# Patient Record
Sex: FEMALE | Race: WHITE | NOT HISPANIC OR LATINO | Employment: FULL TIME | URBAN - NONMETROPOLITAN AREA
[De-identification: names, ages, dates, MRNs, and addresses within clinical notes are randomized per-mention and may not be internally consistent; named-entity substitution may affect disease eponyms.]

---

## 2020-09-07 ENCOUNTER — HOSPITAL ENCOUNTER (EMERGENCY)
Facility: HOSPITAL | Age: 20
Discharge: HOME | End: 2020-09-08
Attending: EMERGENCY MEDICINE | Admitting: EMERGENCY MEDICINE

## 2020-09-07 DIAGNOSIS — R56.9 SEIZURE-LIKE ACTIVITY (HCC): Primary | ICD-10-CM

## 2020-09-07 PROCEDURE — 25010000002 LORAZEPAM PER 2 MG: Performed by: EMERGENCY MEDICINE

## 2020-09-07 PROCEDURE — 99284 EMERGENCY DEPT VISIT MOD MDM: CPT

## 2020-09-07 PROCEDURE — 93005 ELECTROCARDIOGRAM TRACING: CPT | Performed by: EMERGENCY MEDICINE

## 2020-09-07 PROCEDURE — 96374 THER/PROPH/DIAG INJ IV PUSH: CPT

## 2020-09-07 RX ORDER — LORAZEPAM 2 MG/ML
1 INJECTION INTRAMUSCULAR ONCE
Status: COMPLETED | OUTPATIENT
Start: 2020-09-07 | End: 2020-09-07

## 2020-09-07 RX ADMIN — SODIUM CHLORIDE 1000 ML: 9 INJECTION, SOLUTION INTRAVENOUS at 23:58

## 2020-09-07 RX ADMIN — LORAZEPAM 1 MG: 2 INJECTION, SOLUTION INTRAMUSCULAR; INTRAVENOUS at 23:59

## 2020-09-08 VITALS
TEMPERATURE: 98.3 F | DIASTOLIC BLOOD PRESSURE: 70 MMHG | BODY MASS INDEX: 26.58 KG/M2 | RESPIRATION RATE: 16 BRPM | SYSTOLIC BLOOD PRESSURE: 109 MMHG | OXYGEN SATURATION: 99 % | HEIGHT: 63 IN | HEART RATE: 80 BPM | WEIGHT: 150 LBS

## 2020-09-08 LAB
ALBUMIN SERPL-MCNC: 3.9 G/DL (ref 3.5–5.2)
ALBUMIN/GLOB SERPL: 1.4 G/DL
ALP SERPL-CCNC: 90 U/L (ref 39–117)
ALT SERPL W P-5'-P-CCNC: 9 U/L (ref 1–33)
ANION GAP SERPL CALCULATED.3IONS-SCNC: 11.5 MMOL/L (ref 5–15)
AST SERPL-CCNC: 16 U/L (ref 1–32)
BASOPHILS # BLD AUTO: 0.06 10*3/MM3 (ref 0–0.2)
BASOPHILS NFR BLD AUTO: 0.5 % (ref 0–1.5)
BILIRUB SERPL-MCNC: 0.2 MG/DL (ref 0–1.2)
BUN SERPL-MCNC: 7 MG/DL (ref 6–20)
BUN/CREAT SERPL: 10.1 (ref 7–25)
CALCIUM SPEC-SCNC: 9.2 MG/DL (ref 8.6–10.5)
CHLORIDE SERPL-SCNC: 104 MMOL/L (ref 98–107)
CO2 SERPL-SCNC: 23.5 MMOL/L (ref 22–29)
CREAT SERPL-MCNC: 0.69 MG/DL (ref 0.57–1)
DEPRECATED RDW RBC AUTO: 37.5 FL (ref 37–54)
EOSINOPHIL # BLD AUTO: 0.55 10*3/MM3 (ref 0–0.4)
EOSINOPHIL NFR BLD AUTO: 4.6 % (ref 0.3–6.2)
ERYTHROCYTE [DISTWIDTH] IN BLOOD BY AUTOMATED COUNT: 12.2 % (ref 12.3–15.4)
GFR SERPL CREATININE-BSD FRML MDRD: 107 ML/MIN/1.73
GLOBULIN UR ELPH-MCNC: 2.8 GM/DL
GLUCOSE SERPL-MCNC: 88 MG/DL (ref 65–99)
HCG SERPL QL: NEGATIVE
HCT VFR BLD AUTO: 40.8 % (ref 34–46.6)
HGB BLD-MCNC: 14.1 G/DL (ref 12–15.9)
IMM GRANULOCYTES # BLD AUTO: 0.05 10*3/MM3 (ref 0–0.05)
IMM GRANULOCYTES NFR BLD AUTO: 0.4 % (ref 0–0.5)
LYMPHOCYTES # BLD AUTO: 3.56 10*3/MM3 (ref 0.7–3.1)
LYMPHOCYTES NFR BLD AUTO: 30 % (ref 19.6–45.3)
MCH RBC QN AUTO: 29.4 PG (ref 26.6–33)
MCHC RBC AUTO-ENTMCNC: 34.6 G/DL (ref 31.5–35.7)
MCV RBC AUTO: 85 FL (ref 79–97)
MONOCYTES # BLD AUTO: 1.05 10*3/MM3 (ref 0.1–0.9)
MONOCYTES NFR BLD AUTO: 8.8 % (ref 5–12)
NEUTROPHILS NFR BLD AUTO: 55.7 % (ref 42.7–76)
NEUTROPHILS NFR BLD AUTO: 6.61 10*3/MM3 (ref 1.7–7)
NRBC BLD AUTO-RTO: 0 /100 WBC (ref 0–0.2)
PLATELET # BLD AUTO: 269 10*3/MM3 (ref 140–450)
PMV BLD AUTO: 9.7 FL (ref 6–12)
POTASSIUM SERPL-SCNC: 3.7 MMOL/L (ref 3.5–5.2)
PROT SERPL-MCNC: 6.7 G/DL (ref 6–8.5)
RBC # BLD AUTO: 4.8 10*6/MM3 (ref 3.77–5.28)
SODIUM SERPL-SCNC: 139 MMOL/L (ref 136–145)
WBC # BLD AUTO: 11.88 10*3/MM3 (ref 3.4–10.8)

## 2020-09-08 PROCEDURE — 80053 COMPREHEN METABOLIC PANEL: CPT | Performed by: EMERGENCY MEDICINE

## 2020-09-08 PROCEDURE — 85025 COMPLETE CBC W/AUTO DIFF WBC: CPT | Performed by: EMERGENCY MEDICINE

## 2020-09-08 PROCEDURE — 84703 CHORIONIC GONADOTROPIN ASSAY: CPT | Performed by: EMERGENCY MEDICINE

## 2020-09-08 NOTE — DISCHARGE INSTRUCTIONS
Follow-up with your primary care doctor and neurologist, if you do not have a primary care doctor or neurologist follow-up with the providers listed above.

## 2020-09-08 NOTE — ED PROVIDER NOTES
"Subjective   20-year-old female presenting with \"nonepileptic seizure\".  She is brought in by her friend.  Patient is apparently only able to communicate with sign language after having an episode so history obtained from friend and girlfriend.  Apparently patient had onset of facial tics, this progressed to full body convulsions.  This lasted about 45 minutes.  She has no specific complaints at this time.  Patient does note stressors with school.  No other complaints or concerns.          Review of Systems   Constitutional: Negative.    HENT: Negative.    Eyes: Negative.    Respiratory: Negative.    Cardiovascular: Negative.    Gastrointestinal: Negative.    Genitourinary: Negative.    Musculoskeletal: Negative.    Skin: Negative.    Neurological: Positive for tremors and seizures. Negative for weakness, numbness and headaches.   Psychiatric/Behavioral: Negative.        Past Medical History:   Diagnosis Date   • Seizures (CMS/HCC)        No Known Allergies    Past Surgical History:   Procedure Laterality Date   • BREAST SURGERY         History reviewed. No pertinent family history.    Social History     Socioeconomic History   • Marital status: Single     Spouse name: Not on file   • Number of children: Not on file   • Years of education: Not on file   • Highest education level: Not on file   Tobacco Use   • Smoking status: Never Smoker   Substance and Sexual Activity   • Alcohol use: Never     Frequency: Never   • Drug use: Never           Objective   Physical Exam   Constitutional: She is oriented to person, place, and time. She appears well-developed and well-nourished. No distress.   HENT:   Head: Normocephalic and atraumatic.   Right Ear: External ear normal.   Left Ear: External ear normal.   Nose: Nose normal.   Mouth/Throat: Oropharynx is clear and moist.   Eyes: Pupils are equal, round, and reactive to light. Conjunctivae and EOM are normal.   Neck: Normal range of motion. Neck supple.   Cardiovascular: " "Normal rate, regular rhythm, normal heart sounds and intact distal pulses.   Pulmonary/Chest: Effort normal and breath sounds normal. No respiratory distress.   Abdominal: Soft. Bowel sounds are normal. She exhibits no distension. There is no tenderness. There is no rebound and no guarding.   Musculoskeletal: Normal range of motion. She exhibits no edema, tenderness or deformity.   Neurological: She is alert and oriented to person, place, and time.   Patient is nonverbal but communicates via sign language with her friend, shakes her head yes and no to questions, moves all extremities, cranial nerves intact   Skin: Skin is warm and dry. No rash noted.   Psychiatric: She has a normal mood and affect. Her behavior is normal.   Nursing note and vitals reviewed.      Procedures           ED Course                                           MDM  Number of Diagnoses or Management Options  Seizure-like activity (CMS/[facility]):   Diagnosis management comments: 20-year-old female with convulsions.  Well-developed, well-nourished young female no distress with exam as above.  She had normal vital signs.  Her neurologic exam is nonfocal.  This sounds consistent with her known history of \"nonepileptic seizures\".  Will obtain basic labs and give Ativan.  We will continue to monitor for improvement.  Disposition pending.    DDX: Nonepileptic seizures, stress reaction, left eye abnormality    EKG interpreted by me: Sinus rhythm, normal rate, no acute ST/T changes, low-voltage QRS complexes, this is an atypical EKG    Lab work is unremarkable.  She is back to her baseline and resting comfortably.  Will discharge home with outpatient follow-up.  She is happy with this plan.       Amount and/or Complexity of Data Reviewed  Clinical lab tests: reviewed        Final diagnoses:   Seizure-like activity (CMS/[facility])            Jamir Mccullough MD  09/08/20 0120    "

## 2020-09-11 ENCOUNTER — APPOINTMENT (OUTPATIENT)
Dept: CT IMAGING | Facility: HOSPITAL | Age: 20
End: 2020-09-11

## 2020-09-11 ENCOUNTER — HOSPITAL ENCOUNTER (EMERGENCY)
Facility: HOSPITAL | Age: 20
Discharge: HOME | End: 2020-09-11
Attending: EMERGENCY MEDICINE | Admitting: EMERGENCY MEDICINE

## 2020-09-11 VITALS
SYSTOLIC BLOOD PRESSURE: 124 MMHG | OXYGEN SATURATION: 98 % | HEART RATE: 84 BPM | BODY MASS INDEX: 27.14 KG/M2 | WEIGHT: 153.2 LBS | DIASTOLIC BLOOD PRESSURE: 84 MMHG | HEIGHT: 63 IN | TEMPERATURE: 98.6 F | RESPIRATION RATE: 16 BRPM

## 2020-09-11 DIAGNOSIS — S06.0X0A CONCUSSION WITHOUT LOSS OF CONSCIOUSNESS, INITIAL ENCOUNTER: Primary | ICD-10-CM

## 2020-09-11 DIAGNOSIS — R56.9 SEIZURE-LIKE ACTIVITY (HCC): ICD-10-CM

## 2020-09-11 PROCEDURE — 99282 EMERGENCY DEPT VISIT SF MDM: CPT

## 2020-09-11 PROCEDURE — 70450 CT HEAD/BRAIN W/O DYE: CPT

## 2020-09-11 NOTE — ED PROVIDER NOTES
Subjective   History of Present Illness    Chief Complaint: Headache multiple seizures confusion  History of Present Illness: 20-year-old female reports to reported nonepileptic seizures over the last 4 days.  Was seen in ER had basic labs.  Reports negative MRI in July in [facility].  Not on any current antiepileptic medications.  Patient's concern is that she is having left parieto-occipital area headache, has been somewhat forgetful since her last 2 seizure-like activities.  She reported hitting her head on 1 4 days ago.  Onset: 4 days  Duration: Persistent  Exacerbating / Alleviating factors: None  Associated symptoms: None      Nurses Notes reviewed and agree, including vitals, allergies, social history and prior medical history.     REVIEW OF SYSTEMS: All systems reviewed and not pertinent unless noted.    Positive for: Headache seizure-like activity, fall, confusion    Negative for: Vomiting fever neck stiffness neck pain  Review of Systems    Past Medical History:   Diagnosis Date   • Seizures (CMS/HCC)        No Known Allergies    Past Surgical History:   Procedure Laterality Date   • BREAST SURGERY         History reviewed. No pertinent family history.    Social History     Socioeconomic History   • Marital status: Single     Spouse name: Not on file   • Number of children: Not on file   • Years of education: Not on file   • Highest education level: Not on file   Tobacco Use   • Smoking status: Never Smoker   Substance and Sexual Activity   • Alcohol use: Never     Frequency: Never   • Drug use: Never           Objective   Physical Exam    GENERAL APPEARANCE: Well developed, 20-year-old white female,  in no acute distress.  VITAL SIGNS: per nursing, reviewed and noted  SKIN: exposed skin with no rashes, ulcerations or petechiae.  Head: Normocephalic, atraumatic.   EYES: perrla. EOMI.  ENT: Normal voice.  Patient maintained wearing a mask throughout patient encounter due to coronavirus  pandemic  LUNGS:  No increased work of breathing. No retractions.   CARDIOVASCULAR:  regular rate and rhythm, no murmurs.  Good Peripheral pulses. Good cap refill to extremities.   ABDOMEN: Soft, nontender, normal bowel sounds. No hernia. No ascites.  MUSCULOSKELETAL:  No tenderness. Full ROM. Strength and tone normal.  NEUROLOGIC: Alert, oriented x 3. No gross deficits. GCS 15.  No cerebellar signs.  No nuchal rigidity.  No meningeal signs.  NECK: Supple, symmetric. No tenderness, no masses. Full ROM  Back: full rom, no paraspinal spasm. No CVA tenderness.   PSYCH: appropriate affect.  : no bladder tenderness or distention, no CVA tenderness      Procedures     No attending physician procedures were performed on this patient.      ED Course    Reviewed CBC CMP pregnancy test from visit early hours of 9/8/2020.  All negative.  We will add CT scan the low suspicion for acute intercranial hemorrhage.  Symptoms suggestive of concussion.                                       MDM  Negative head CT per radiologist.  No indications for admission medication changes.  Discharged home stable condition with supportive care recommendations.  Patient has EEG scheduled with her neurologist.  Outpatient follow-up precautions discussed.  Final diagnoses:   Concussion without loss of consciousness, initial encounter   Seizure-like activity (CMS/[facility])            Fam Aguilar,   09/11/20 2026

## 2021-03-16 ENCOUNTER — OFFICE VISIT (OUTPATIENT)
Dept: ORTHOPEDIC SURGERY | Facility: CLINIC | Age: 21
End: 2021-03-16

## 2021-03-16 VITALS — TEMPERATURE: 97.8 F | BODY MASS INDEX: 26.58 KG/M2 | HEIGHT: 63 IN | WEIGHT: 150 LBS

## 2021-03-16 DIAGNOSIS — M25.562 ARTHRALGIA OF LEFT KNEE: Primary | ICD-10-CM

## 2021-03-16 DIAGNOSIS — S83.422A SPRAIN OF LATERAL COLLATERAL LIGAMENT OF LEFT KNEE, INITIAL ENCOUNTER: ICD-10-CM

## 2021-03-16 PROCEDURE — 99203 OFFICE O/P NEW LOW 30 MIN: CPT | Performed by: ORTHOPAEDIC SURGERY

## 2021-03-16 NOTE — PROGRESS NOTES
Subjective   Patient ID: Laurie Graham is a 20 y.o. female  Pain and Injury of the Left Knee (Referred by [facility] for left knee pain s/p fall on 3/11/21. She was long boarding, fell off and landed on left knee. She has pain with all movements of leg and knee.  placed her in a knee immobilizer and crutches. )             History of Present Illness  20-year-old who works as a   injured her left knee when she was on a long board fell off it as it was moving it twisted underneath her she felt a pop at pain and swelling initially she try to go to work the next day with weightbearing it gave way and she had some pain in along the medial side recur.  She went to urgent care had an x-ray negative for fracture given a knee immobilizer and crutches and sent here for follow-up.  Used to do a lot of cheerleading activities but never had instability locking or need for prior orthopedic treatment for her left knee in the past.  Denies current hip pain ankle pain paresthesias or other injuries.            Review of Systems   Constitutional: Negative for fever.   HENT: Negative for dental problem and voice change.    Eyes: Negative for visual disturbance.   Respiratory: Negative for shortness of breath.    Cardiovascular: Negative for chest pain.   Gastrointestinal: Negative for abdominal pain.   Genitourinary: Negative for dysuria.   Musculoskeletal: Positive for arthralgias, gait problem and joint swelling.   Skin: Negative for rash.   Neurological: Negative for speech difficulty.   Hematological: Does not bruise/bleed easily.   Psychiatric/Behavioral: Negative for confusion.       Past Medical History:   Diagnosis Date   • Seizures (CMS/HCC)         Past Surgical History:   Procedure Laterality Date   • BREAST SURGERY         No family history on file.    Social History     Socioeconomic History   • Marital status: Single     Spouse name: Not on file   • Number of children: Not on file   • Years of education:  "Not on file   • Highest education level: Not on file   Tobacco Use   • Smoking status: Never Smoker   • Smokeless tobacco: Never Used   Vaping Use   • Vaping Use: Never used   Substance and Sexual Activity   • Alcohol use: Never   • Drug use: Never       I have reviewed the medical and surgical history, family history, social history, medications, and/or allergies, and the review of systems of this report.    No Known Allergies      Current Outpatient Medications:   •  citalopram (CeleXA) 20 MG tablet, Take 20 mg by mouth Daily., Disp: , Rfl:   •  ibuprofen (ADVIL,MOTRIN) 600 MG tablet, Take 1 tablet by mouth 3 (Three) Times a Day., Disp: 30 tablet, Rfl: 0    Objective   Temp 97.8 °F (36.6 °C)   Ht 160 cm (63\")   Wt 68 kg (150 lb)   LMP 02/26/2021   BMI 26.57 kg/m²    Physical Exam  Constitutional: Patient is oriented to person, place, and time. Patient appears well-developed and well-nourished.   HENT:Head: Normocephalic and atraumatic.   Eyes: EOM are normal. Pupils are equal, round, and reactive to light.   Neck: Normal range of motion. Neck supple.   Cardiovascular: Normal rate.    Pulmonary/Chest: Effort normal and breath sounds normal.   Abdominal: Soft.   Neurological: Patient is alert and oriented to person, place, and time.   Skin: Skin is warm and dry.   Psychiatric: Patient has a normal mood and affect.   Nursing note and vitals reviewed.       [unfilled]   Left knee: 2+ effusion some tenderness over the medial patellofemoral retinacular area very slight ecchymosis over the medial distal insertional area of the patellar tendon no extensor lag full extension full flexion Lachman sign negative some posterior lateral joint line pain and inferolateral pain with varus stress at the lateral collateral insertion.  Calf supple neurovascularly intact Gaston sign negative for medial joint line pain equivocal for lateral joint line pain.  No signs of posterior lateral rotatory instability.    Assessment/Plan "   Review of Radiographic Studies:    No new imaging done today.      Procedures     Diagnoses and all orders for this visit:    1. Arthralgia of left knee (Primary)    2. Sprain of lateral collateral ligament of left knee, initial encounter       Physical therapy referral given, Use brace as instructed and Work status form completed and provided to patient      Recommendations/Plan:   Work/Activity Status: Unable to return to work until next evaluation    Patient agreeable to call or return sooner for any concerns.         PT referral for range of motion weightbearing as tolerated    Impression:  Acute onset left knee sprain rule out lateral meniscal tear and/or lateral collateral ligament injury  Plan:  MR left knee icing hinged knee brace weight-bear as tolerated remain out of work recheck with me after MRI complete

## 2021-03-23 ENCOUNTER — HOSPITAL ENCOUNTER (EMERGENCY)
Facility: HOSPITAL | Age: 21
Discharge: HOME | End: 2021-03-23
Attending: EMERGENCY MEDICINE | Admitting: EMERGENCY MEDICINE

## 2021-03-23 VITALS
WEIGHT: 143 LBS | DIASTOLIC BLOOD PRESSURE: 79 MMHG | RESPIRATION RATE: 16 BRPM | HEART RATE: 87 BPM | BODY MASS INDEX: 24.41 KG/M2 | SYSTOLIC BLOOD PRESSURE: 127 MMHG | HEIGHT: 64 IN | OXYGEN SATURATION: 100 % | TEMPERATURE: 98.3 F

## 2021-03-23 DIAGNOSIS — G43.009 MIGRAINE WITHOUT AURA AND WITHOUT STATUS MIGRAINOSUS, NOT INTRACTABLE: Primary | ICD-10-CM

## 2021-03-23 PROCEDURE — 25010000002 DIPHENHYDRAMINE PER 50 MG: Performed by: PHYSICIAN ASSISTANT

## 2021-03-23 PROCEDURE — 96375 TX/PRO/DX INJ NEW DRUG ADDON: CPT

## 2021-03-23 PROCEDURE — 96361 HYDRATE IV INFUSION ADD-ON: CPT

## 2021-03-23 PROCEDURE — 25010000002 METOCLOPRAMIDE PER 10 MG: Performed by: PHYSICIAN ASSISTANT

## 2021-03-23 PROCEDURE — 99283 EMERGENCY DEPT VISIT LOW MDM: CPT

## 2021-03-23 PROCEDURE — 25010000002 KETOROLAC TROMETHAMINE PER 15 MG: Performed by: PHYSICIAN ASSISTANT

## 2021-03-23 PROCEDURE — 96374 THER/PROPH/DIAG INJ IV PUSH: CPT

## 2021-03-23 RX ORDER — METOCLOPRAMIDE HYDROCHLORIDE 5 MG/ML
10 INJECTION INTRAMUSCULAR; INTRAVENOUS ONCE
Status: COMPLETED | OUTPATIENT
Start: 2021-03-23 | End: 2021-03-23

## 2021-03-23 RX ORDER — KETOROLAC TROMETHAMINE 30 MG/ML
30 INJECTION, SOLUTION INTRAMUSCULAR; INTRAVENOUS EVERY 6 HOURS PRN
Status: DISCONTINUED | OUTPATIENT
Start: 2021-03-23 | End: 2021-03-23 | Stop reason: HOSPADM

## 2021-03-23 RX ORDER — DIPHENHYDRAMINE HYDROCHLORIDE 50 MG/ML
25 INJECTION INTRAMUSCULAR; INTRAVENOUS ONCE
Status: COMPLETED | OUTPATIENT
Start: 2021-03-23 | End: 2021-03-23

## 2021-03-23 RX ADMIN — METOCLOPRAMIDE 10 MG: 5 INJECTION, SOLUTION INTRAMUSCULAR; INTRAVENOUS at 18:11

## 2021-03-23 RX ADMIN — DIPHENHYDRAMINE HYDROCHLORIDE 25 MG: 50 INJECTION, SOLUTION INTRAMUSCULAR; INTRAVENOUS at 18:08

## 2021-03-23 RX ADMIN — SODIUM CHLORIDE 1000 ML: 9 INJECTION, SOLUTION INTRAVENOUS at 18:09

## 2021-03-23 RX ADMIN — KETOROLAC TROMETHAMINE 30 MG: 30 INJECTION, SOLUTION INTRAMUSCULAR at 18:11

## 2021-03-31 ENCOUNTER — HOSPITAL ENCOUNTER (OUTPATIENT)
Dept: MRI IMAGING | Facility: HOSPITAL | Age: 21
End: 2021-03-31

## 2021-06-01 ENCOUNTER — OFFICE VISIT (OUTPATIENT)
Dept: INTERNAL MEDICINE | Facility: CLINIC | Age: 21
End: 2021-06-01

## 2021-06-01 VITALS
RESPIRATION RATE: 14 BRPM | TEMPERATURE: 97.8 F | HEART RATE: 85 BPM | DIASTOLIC BLOOD PRESSURE: 66 MMHG | SYSTOLIC BLOOD PRESSURE: 119 MMHG | HEIGHT: 64 IN | BODY MASS INDEX: 24.92 KG/M2 | WEIGHT: 146 LBS | OXYGEN SATURATION: 98 %

## 2021-06-01 DIAGNOSIS — H66.003 ACUTE SUPPURATIVE OTITIS MEDIA OF BOTH EARS WITHOUT SPONTANEOUS RUPTURE OF TYMPANIC MEMBRANES, RECURRENCE NOT SPECIFIED: ICD-10-CM

## 2021-06-01 DIAGNOSIS — F41.9 ANXIETY: ICD-10-CM

## 2021-06-01 DIAGNOSIS — J01.00 ACUTE NON-RECURRENT MAXILLARY SINUSITIS: ICD-10-CM

## 2021-06-01 DIAGNOSIS — F33.0 MILD EPISODE OF RECURRENT MAJOR DEPRESSIVE DISORDER (HCC): ICD-10-CM

## 2021-06-01 DIAGNOSIS — E78.2 MIXED HYPERLIPIDEMIA: ICD-10-CM

## 2021-06-01 DIAGNOSIS — J06.0 ACUTE LARYNGOPHARYNGITIS: Primary | ICD-10-CM

## 2021-06-01 PROCEDURE — 99213 OFFICE O/P EST LOW 20 MIN: CPT | Performed by: INTERNAL MEDICINE

## 2021-06-01 RX ORDER — AMOXICILLIN 500 MG/1
500 CAPSULE ORAL 3 TIMES DAILY
Qty: 30 CAPSULE | Refills: 0 | Status: SHIPPED | OUTPATIENT
Start: 2021-06-01 | End: 2021-06-11

## 2021-06-01 NOTE — PATIENT INSTRUCTIONS
MyPlate from USDA    MyPlate is an outline of a general healthy diet based on the 2010 Dietary Guidelines for Americans, from the U.S. Department of Agriculture (USDA). It sets guidelines for how much food you should eat from each food group based on your age, sex, and level of physical activity.  What are tips for following MyPlate?  To follow MyPlate recommendations:  · Eat a wide variety of fruits and vegetables, grains, and protein foods.  · Serve smaller portions and eat less food throughout the day.  · Limit portion sizes to avoid overeating.  · Enjoy your food.  · Get at least 150 minutes of exercise every week. This is about 30 minutes each day, 5 or more days per week.  It can be difficult to have every meal look like MyPlate. Think about MyPlate as eating guidelines for an entire day, rather than each individual meal.  Fruits and vegetables  · Make half of your plate fruits and vegetables.  · Eat many different colors of fruits and vegetables each day.  · For a 2,000 calorie daily food plan, eat:  ? 2½ cups of vegetables every day.  ? 2 cups of fruit every day.  · 1 cup is equal to:  ? 1 cup raw or cooked vegetables.  ? 1 cup raw fruit.  ? 1 medium-sized orange, apple, or banana.  ? 1 cup 100% fruit or vegetable juice.  ? 2 cups raw leafy greens, such as lettuce, spinach, or kale.  ? ½ cup dried fruit.  Grains  · One fourth of your plate should be grains.  · Make at least half of the grains you eat each day whole grains.  · For a 2,000 calorie daily food plan, eat 6 oz of grains every day.  · 1 oz is equal to:  ? 1 slice bread.  ? 1 cup cereal.  ? ½ cup cooked rice, cereal, or pasta.  Protein  · One fourth of your plate should be protein.  · Eat a wide variety of protein foods, including meat, poultry, fish, eggs, beans, nuts, and tofu.  · For a 2,000 calorie daily food plan, eat 5½ oz of protein every day.  · 1 oz is equal to:  ? 1 oz meat, poultry, or fish.  ? ¼ cup cooked beans.  ? 1 egg.  ? ½ oz nuts  or seeds.  ? 1 Tbsp peanut butter.  Dairy  · Drink fat-free or low-fat (1%) milk.  · Eat or drink dairy as a side to meals.  · For a 2,000 calorie daily food plan, eat or drink 3 cups of dairy every day.  · 1 cup is equal to:  ? 1 cup milk, yogurt, cottage cheese, or soy milk (soy beverage).  ? 2 oz processed cheese.  ? 1½ oz natural cheese.  Fats, oils, salt, and sugars  · Only small amounts of oils are recommended.  · Avoid foods that are high in calories and low in nutritional value (empty calories), like foods high in fat or added sugars.  · Choose foods that are low in salt (sodium). Choose foods that have less than 140 milligrams (mg) of sodium per serving.  · Drink water instead of sugary drinks. Drink enough water each day to keep your urine pale yellow.  Where to find support  · Work with your health care provider or a nutrition specialist (dietitian) to develop a customized eating plan that is right for you.  · Download an vesta (mobile application) to help you track your daily food intake.  Where to find more information  · Go to ChooseMyPlate.gov for more information.  Summary  · MyPlate is a general guideline for healthy eating from the USDA. It is based on the 2010 Dietary Guidelines for Americans.  · In general, fruits and vegetables should take up ½ of your plate, grains should take up ¼ of your plate, and protein should take up ¼ of your plate.  This information is not intended to replace advice given to you by your health care provider. Make sure you discuss any questions you have with your health care provider.  Document Revised: 05/21/2020 Document Reviewed: 03/19/2018  Elsevier Patient Education © 2021 [employer] Inc.

## 2021-06-01 NOTE — PROGRESS NOTES
Subjective   Laurie Graham is a 20 y.o. female.     Chief Complaint   Patient presents with   • Sore Throat   • Sinus Problem   • Nasal Congestion       History of Present Illness   Patient is here for initial visit, she is due for labs for her cholesterol,she complainsof a sore throat since  Sunday , she states she is also coughing up greenish sputum , she also complains of sinus congestion with greenish nasal discharge  , she also complains of  Nausea due to the congestion , she sees psychiatry in [place] and is on celexa for anxiety and depression     Review of Systems   Constitutional: Negative for appetite change, fatigue and fever.   HENT: Negative for congestion, ear discharge, ear pain, sinus pressure and sore throat.    Eyes: Negative for pain and discharge.   Respiratory: Negative for cough, chest tightness, shortness of breath and wheezing.    Cardiovascular: Negative for chest pain, palpitations and leg swelling.   Gastrointestinal: Negative for abdominal pain, blood in stool, constipation, diarrhea and nausea.   Endocrine: Negative for cold intolerance and heat intolerance.   Genitourinary: Negative for dysuria, flank pain and frequency.   Musculoskeletal: Negative for back pain and joint swelling.   Skin: Negative for color change.   Allergic/Immunologic: Negative for environmental allergies and food allergies.   Neurological: Negative for dizziness, weakness, numbness and headaches.   Hematological: Negative for adenopathy. Does not bruise/bleed easily.   Psychiatric/Behavioral: Positive for dysphoric mood. Negative for behavioral problems. The patient is nervous/anxious.        Past Medical History:   Diagnosis Date   • Asthma    • Bipolar 1 disorder (CMS/HCC)    • Depression    • Migraine    • Seizures (CMS/HCC)        Past Surgical History:   Procedure Laterality Date   • BREAST SURGERY Left 2016       Family History   Problem Relation Age of Onset   • Depression Mother    • Polycystic ovary  "syndrome Mother    • Endometriosis Mother    • Hypertension Father    • Cancer Maternal Grandmother         thyroid   • Mental illness Maternal Grandfather    • Diabetes Maternal Grandfather    • Cancer Maternal Great-Grandmother         ovarian        reports that she has never smoked. She has never used smokeless tobacco. She reports that she does not drink alcohol and does not use drugs.    No Known Allergies        Current Outpatient Medications:   •  citalopram (CeleXA) 20 MG tablet, Take 20 mg by mouth Daily., Disp: , Rfl:   •  amoxicillin (AMOXIL) 500 MG capsule, Take 1 capsule by mouth 3 (Three) Times a Day for 10 days., Disp: 30 capsule, Rfl: 0      Objective   Blood pressure 119/66, pulse 85, temperature 97.8 °F (36.6 °C), resp. rate 14, height 162.6 cm (64.02\"), weight 66.2 kg (146 lb), SpO2 98 %.    Physical Exam  Vitals and nursing note reviewed.   Constitutional:       General: She is not in acute distress.     Appearance: Normal appearance. She is not diaphoretic.   HENT:      Head: Normocephalic and atraumatic.      Right Ear: External ear normal.      Left Ear: External ear normal.      Nose: Nose normal.   Eyes:      Extraocular Movements: Extraocular movements intact.      Conjunctiva/sclera: Conjunctivae normal.   Neck:      Trachea: Trachea normal.   Cardiovascular:      Rate and Rhythm: Normal rate and regular rhythm.      Heart sounds: Normal heart sounds.   Pulmonary:      Effort: Pulmonary effort is normal. No respiratory distress.      Breath sounds: Normal breath sounds.   Abdominal:      General: Abdomen is flat.   Musculoskeletal:      Cervical back: Neck supple.      Comments: Moves all limbs   Skin:     General: Skin is warm.   Neurological:      Mental Status: She is alert and oriented to person, place, and time.      Comments: No gross motor or sensory deficits         Patient's Body mass index is 25.05 kg/m².        Results for orders placed or performed during the hospital " encounter of 09/07/20   Comprehensive Metabolic Panel    Specimen: Blood   Result Value Ref Range    Glucose 88 65 - 99 mg/dL    BUN 7 6 - 20 mg/dL    Creatinine 0.69 0.57 - 1.00 mg/dL    Sodium 139 136 - 145 mmol/L    Potassium 3.7 3.5 - 5.2 mmol/L    Chloride 104 98 - 107 mmol/L    CO2 23.5 22.0 - 29.0 mmol/L    Calcium 9.2 8.6 - 10.5 mg/dL    Total Protein 6.7 6.0 - 8.5 g/dL    Albumin 3.90 3.50 - 5.20 g/dL    ALT (SGPT) 9 1 - 33 U/L    AST (SGOT) 16 1 - 32 U/L    Alkaline Phosphatase 90 39 - 117 U/L    Total Bilirubin 0.2 0.0 - 1.2 mg/dL    eGFR Non African Amer 107 >60 mL/min/1.73    Globulin 2.8 gm/dL    A/G Ratio 1.4 g/dL    BUN/Creatinine Ratio 10.1 7.0 - 25.0    Anion Gap 11.5 5.0 - 15.0 mmol/L   hCG, Serum, Qualitative    Specimen: Blood   Result Value Ref Range    HCG Qualitative Negative Negative   CBC Auto Differential    Specimen: Blood   Result Value Ref Range    WBC 11.88 (H) 3.40 - 10.80 10*3/mm3    RBC 4.80 3.77 - 5.28 10*6/mm3    Hemoglobin 14.1 12.0 - 15.9 g/dL    Hematocrit 40.8 34.0 - 46.6 %    MCV 85.0 79.0 - 97.0 fL    MCH 29.4 26.6 - 33.0 pg    MCHC 34.6 31.5 - 35.7 g/dL    RDW 12.2 (L) 12.3 - 15.4 %    RDW-SD 37.5 37.0 - 54.0 fl    MPV 9.7 6.0 - 12.0 fL    Platelets 269 140 - 450 10*3/mm3    Neutrophil % 55.7 42.7 - 76.0 %    Lymphocyte % 30.0 19.6 - 45.3 %    Monocyte % 8.8 5.0 - 12.0 %    Eosinophil % 4.6 0.3 - 6.2 %    Basophil % 0.5 0.0 - 1.5 %    Immature Grans % 0.4 0.0 - 0.5 %    Neutrophils, Absolute 6.61 1.70 - 7.00 10*3/mm3    Lymphocytes, Absolute 3.56 (H) 0.70 - 3.10 10*3/mm3    Monocytes, Absolute 1.05 (H) 0.10 - 0.90 10*3/mm3    Eosinophils, Absolute 0.55 (H) 0.00 - 0.40 10*3/mm3    Basophils, Absolute 0.06 0.00 - 0.20 10*3/mm3    Immature Grans, Absolute 0.05 0.00 - 0.05 10*3/mm3    nRBC 0.0 0.0 - 0.2 /100 WBC         Assessment/Plan   Diagnoses and all orders for this visit:    1. Acute laryngopharyngitis (Primary)    2. Acute non-recurrent maxillary sinusitis    3. Acute  suppurative otitis media of both ears without spontaneous rupture of tympanic membranes, recurrence not specified    4. Mixed hyperlipidemia  -     CBC & Differential  -     Comprehensive Metabolic Panel  -     Lipid Panel    5. Anxiety    6. Mild episode of recurrent major depressive disorder (CMS/HCC)    Other orders  -     amoxicillin (AMOXIL) 500 MG capsule; Take 1 capsule by mouth 3 (Three) Times a Day for 10 days.  Dispense: 30 capsule; Refill: 0     Plan :   1.mixed hyperlipidemia: Diet and exercise counseled, will obtain fasting labs   2 . Acute suppurative otitis media: will   start oral antibiotics    3.Acute maxillary sinusitis:   will   start oral antibiotics   4. Acute laryngopharyngitis:  will   Start  oral antibiotics , in office strep test negative  5.  Anxiety disorder: To continue per her psychiatrist in [place] and therapist, on Celexa  6.  Major depression : continue per behavioral health specialist   I  spent 30 minutes caring for Laurie on this date of service. This time includes time spent by me in the following activities:preparing for the visit, reviewing tests, performing a medically appropriate examination and/or evaluation , counseling and educating the patient/family/caregiver, ordering medications, tests, or procedures and documenting information in the medical record             Ara Steward MD

## 2021-10-27 ENCOUNTER — TELEPHONE (OUTPATIENT)
Dept: INTERNAL MEDICINE | Facility: CLINIC | Age: 21
End: 2021-10-27

## 2021-10-27 NOTE — TELEPHONE ENCOUNTER
Caller: Laurie Graham    Relationship to patient: Self    Best call back number: [phone]    Patient is needing: PATIENT STATED THAT HER LEFT SHOULDER HAS BEEN SWELLING AND A LOT OF PAIN AND DISCOMFORT AND WOULD LIKE TO KNOW WHAT TO DO    PLEASE ADVISE

## 2021-10-28 ENCOUNTER — OFFICE VISIT (OUTPATIENT)
Dept: INTERNAL MEDICINE | Facility: CLINIC | Age: 21
End: 2021-10-28

## 2021-10-28 VITALS
DIASTOLIC BLOOD PRESSURE: 80 MMHG | HEIGHT: 64 IN | HEART RATE: 85 BPM | WEIGHT: 163 LBS | RESPIRATION RATE: 14 BRPM | SYSTOLIC BLOOD PRESSURE: 123 MMHG | BODY MASS INDEX: 27.83 KG/M2 | OXYGEN SATURATION: 98 % | TEMPERATURE: 98.2 F

## 2021-10-28 DIAGNOSIS — M25.512 ACUTE PAIN OF LEFT SHOULDER: Primary | ICD-10-CM

## 2021-10-28 PROCEDURE — 99213 OFFICE O/P EST LOW 20 MIN: CPT | Performed by: INTERNAL MEDICINE

## 2021-10-28 RX ORDER — PRAZOSIN HYDROCHLORIDE 1 MG/1
1 CAPSULE ORAL 2 TIMES DAILY
COMMUNITY

## 2021-10-28 RX ORDER — LAMOTRIGINE 25 MG/1
50 TABLET ORAL DAILY
COMMUNITY

## 2021-10-28 RX ORDER — MIRTAZAPINE 15 MG/1
15 TABLET, FILM COATED ORAL NIGHTLY
COMMUNITY
End: 2022-03-25 | Stop reason: SDUPTHER

## 2021-10-28 NOTE — PROGRESS NOTES
"Chief Complaint  Shoulder Pain (left x's 3 weeks)    Subjective          Laurie Graham presents to [facility]  History of Present Illness  Patient is here complaining of pain in the left shoulder, she states that she has difficulty raising it above her head, she denies any falls or injuries, this has been ongoing for 3 weeks,    Objective   Vital Signs:   /80   Pulse 85   Temp 98.2 °F (36.8 °C)   Resp 14   Ht 162.6 cm (64.02\")   Wt 73.9 kg (163 lb)   SpO2 98%   BMI 27.96 kg/m²     Physical Exam  Vitals and nursing note reviewed.   Constitutional:       General: She is not in acute distress.     Appearance: Normal appearance. She is not diaphoretic.   HENT:      Head: Normocephalic and atraumatic.      Right Ear: External ear normal.      Left Ear: External ear normal.      Nose: Nose normal.   Eyes:      Extraocular Movements: Extraocular movements intact.      Conjunctiva/sclera: Conjunctivae normal.   Neck:      Trachea: Trachea normal.   Cardiovascular:      Rate and Rhythm: Normal rate and regular rhythm.      Heart sounds: Normal heart sounds.   Pulmonary:      Effort: Pulmonary effort is normal. No respiratory distress.      Breath sounds: Normal breath sounds.   Abdominal:      General: Abdomen is flat.   Musculoskeletal:      Cervical back: Neck supple.      Comments: Left shoulder restriction of motion   Skin:     General: Skin is warm.   Neurological:      Mental Status: She is alert and oriented to person, place, and time.      Comments: No gross motor or sensory deficits        Result Review :                   Assessment and Plan    Diagnoses and all orders for this visit:    1. Acute pain of left shoulder (Primary)  -     XR Shoulder 2+ View Left  -     Ambulatory Referral to Orthopedic Surgery    Plan:  1.acute left shoulder pain: We'll obtain x-ray and refer patient to orthopedist  I spent 20 minutes caring for Laurie on this date of service. This time " includes time spent by me in the following activities:preparing for the visit, reviewing tests, performing a medically appropriate examination and/or evaluation , counseling and educating the patient/family/caregiver, ordering medications, tests, or procedures and documenting information in the medical record  Follow Up    Patient was given instructions and counseling regarding her condition or for health maintenance advice. Please see specific information pulled into the AVS if appropriate.

## 2021-11-01 ENCOUNTER — HOSPITAL ENCOUNTER (OUTPATIENT)
Dept: GENERAL RADIOLOGY | Facility: HOSPITAL | Age: 21
Discharge: HOME | End: 2021-11-01
Admitting: INTERNAL MEDICINE

## 2021-11-01 PROCEDURE — 73030 X-RAY EXAM OF SHOULDER: CPT

## 2021-11-02 ENCOUNTER — TELEPHONE (OUTPATIENT)
Dept: INTERNAL MEDICINE | Facility: CLINIC | Age: 21
End: 2021-11-02

## 2021-11-02 NOTE — TELEPHONE ENCOUNTER
Caller: Laurie Graham    Relationship to patient: Self    Best call back number: [phone]    Patient is needing: PATIENT HAD A QUESTION ABOUT HER RESULTS FROM XRAY AND WOULD LIKE TO KNOW WHEN SHE WOULD NEED SCHEDULE AN APPOINTMENT FOR ORTHO BECAUSE HER ARM IS NOT ABLE TO BE USED AND HAVING POPPING NOISE AND BRUISING     PATIENT WOULD LIKE TO TALK TO NURSE OR ROBERT ABOUT THIS ISSUE    PLEASE ADVISE

## 2021-11-02 NOTE — TELEPHONE ENCOUNTER
She was already referred to Ortho and she has an appointment to see them on Thursday, please give her the information

## 2021-11-04 ENCOUNTER — OFFICE VISIT (OUTPATIENT)
Dept: ORTHOPEDIC SURGERY | Facility: CLINIC | Age: 21
End: 2021-11-04

## 2021-11-04 VITALS — BODY MASS INDEX: 27.79 KG/M2 | WEIGHT: 162.8 LBS | HEIGHT: 64 IN

## 2021-11-04 DIAGNOSIS — M54.12 CERVICAL RADICULOPATHY: ICD-10-CM

## 2021-11-04 DIAGNOSIS — M25.512 ARTHRALGIA OF LEFT SHOULDER REGION: Primary | ICD-10-CM

## 2021-11-04 PROCEDURE — 99214 OFFICE O/P EST MOD 30 MIN: CPT | Performed by: ORTHOPAEDIC SURGERY

## 2021-11-04 RX ORDER — METHYLPREDNISOLONE 4 MG/1
TABLET ORAL
Qty: 21 TABLET | Refills: 0 | Status: SHIPPED | OUTPATIENT
Start: 2021-11-04 | End: 2021-11-06

## 2021-11-04 NOTE — PROGRESS NOTES
Subjective   Patient ID: Laurie Graham is a 21 y.o. female  Pain of the Left Shoulder (Ref by Ara Steward MD for left shoulder pain x 2 months. No known injury. She thinks she may have slept on it wrong, but it has not improved. Difficulty with raising arm, feels grinding and popping in shoulder. 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 Gives a history of intermittent neck pain off and on for many years used to receive chiropractic treatment with minimal improvement at times the pain radiates to the right side of her neck is worse on the left side of the neck currently. Denies lower leg paresthesias or weakness history of recent cervical trauma headache or other acute medical illness.      Review of Systems   Constitutional: Negative for fever.   HENT: Negative for dental problem and voice change.    Eyes: Negative for visual disturbance.   Respiratory: Negative for shortness of breath.    Cardiovascular: Negative for chest pain.   Gastrointestinal: Negative for abdominal pain.   Genitourinary: Negative for dysuria.   Musculoskeletal: Positive for arthralgias. Negative for gait problem and joint swelling.   Skin: Negative for rash.   Neurological: Positive for weakness and numbness. Negative for speech difficulty.   Hematological: Does not bruise/bleed easily.   Psychiatric/Behavioral: Negative for confusion.       Past Medical History:   Diagnosis Date   • Asthma    • Bipolar 1 disorder (HCC)    • Depression    • Migraine    • Seizures (HCC)         Past Surgical History:   Procedure Laterality Date   • BREAST SURGERY Left 2016       Family History   Problem  "Relation Age of Onset   • Depression Mother    • Polycystic ovary syndrome Mother    • Endometriosis Mother    • Hypertension Father    • Cancer Maternal Grandmother         thyroid   • Mental illness Maternal Grandfather    • Diabetes Maternal Grandfather    • Cancer Maternal Great-Grandmother         ovarian       Social History     Socioeconomic History   • Marital status: Single   Tobacco Use   • Smoking status: Never Smoker   • Smokeless tobacco: Never Used   Vaping Use   • Vaping Use: Never used   Substance and Sexual Activity   • Alcohol use: Never   • Drug use: Never   • Sexual activity: Defer       I have reviewed the medical and surgical history, family history, social history, medications, and/or allergies, and the review of systems of this report.    No Known Allergies      Current Outpatient Medications:   •  lamoTRIgine (LaMICtal) 25 MG tablet, Take 25 mg by mouth Daily., Disp: , Rfl:   •  mirtazapine (REMERON) 15 MG tablet, Take 15 mg by mouth Every Night., Disp: , Rfl:   •  prazosin (MINIPRESS) 1 MG capsule, Take 1 mg by mouth 2 (Two) Times a Day., Disp: , Rfl:   •  methylPREDNISolone (MEDROL) 4 MG dose pack, Take as directed on package instructions., Disp: 21 tablet, Rfl: 0    Objective   Ht 162.6 cm (64.02\")   Wt 73.8 kg (162 lb 12.8 oz)   BMI 27.93 kg/m²    Physical Exam  Constitutional: Patient is oriented to person, place, and time. Patient appears well-developed and well-nourished.   HENT:Head: Normocephalic and atraumatic.   Eyes: EOM are normal. Pupils are equal, round, and reactive to light.   Neck: Normal range of motion. Neck supple.   Cardiovascular: Normal rate.    Pulmonary/Chest: Effort normal and breath sounds normal.   Abdominal: Soft.   Neurological: Patient is alert and oriented to person, place, and time.   Skin: Skin is warm and dry.   Psychiatric: Patient has a normal mood and affect.   Nursing note and vitals reviewed.       [unfilled]   Cervical spine: Markedly positive " Spurling finding with reproduction of paresthesias and pain that radiates from the shoulder down to the fingertips with left-sided rotation extension. Significant paracervical spasm. No tenderness at the AC joint negative impingement sign rotator cuff strength intact throughout active range of motion left shoulder full. Both biceps reflexes are symmetric both brachioradialis reflexes are absent no clonus in the lower extremities no focal weakness in the left hand.    Assessment/Plan   Review of Radiographic Studies:    No new imaging done today.      Procedures     Diagnoses and all orders for this visit:    1. Arthralgia of left shoulder region (Primary)    2. Cervical radiculopathy  -     MRI Cervical Spine Without Contrast  -     Ambulatory Referral to Physical Therapy Evaluate and treat    Other orders  -     methylPREDNISolone (MEDROL) 4 MG dose pack; Take as directed on package instructions.  Dispense: 21 tablet; Refill: 0       Physical therapy referral given and Work status form completed and provided to patient      Recommendations/Plan:   Work/Activity Status: No overhead lifting or reaching    Patient agreeable to call or return sooner for any concerns.             Impression:  Left-sided neck and arm pain cervical radiculopathy  Plan:  MR cervical spine, Medrol Dosepak, followed by ibuprofen 800, PT referral, work restrictions no overhead lifting reaching

## 2021-11-06 PROCEDURE — U0004 COV-19 TEST NON-CDC HGH THRU: HCPCS | Performed by: FAMILY MEDICINE

## 2021-11-10 ENCOUNTER — TELEPHONE (OUTPATIENT)
Dept: INTERNAL MEDICINE | Facility: CLINIC | Age: 21
End: 2021-11-10

## 2021-11-10 NOTE — TELEPHONE ENCOUNTER
Caller: Santos Laurie    Relationship: Self    Best call back number:     What form or medical record are you requesting: [employer] IS SENDING A FORM FOR HER TO GET BACK HER LICENSE AS SHE IS SEIZURE FREE    Who is requesting this form or medical record from you: NORM    How would you like to receive the form or medical records (pick-up, mail, fax): FAX    If fax, what is the fax number:[phone] ,[facility] BOARD OF REVIEW     Timeframe paperwork needed:  ASAP    Additional notes:  PATIENT SAID THAT [employer] HAS SENT THE FORM ALREADY SHE JUST NEEDS DR JONES SIGN OFF ON IT TO SAY SHE IS SEIZURE FREE

## 2021-11-15 NOTE — TELEPHONE ENCOUNTER
PATIENT IS WANTING TO KNOW IF SHE COULD GET THE FORM SIGNED AS SOON AS POSSIBLE SO SHE CAN GO BACK TO WORK.

## 2021-11-16 NOTE — TELEPHONE ENCOUNTER
"Per Dr. DOMENICO Hayden, pt was advised she needs to see Neurology to get clearance from seizures. Pt kept insisting that all Dr. DOMENICO Hayden had to do was sign the form so she could get her license, I advised pt that we have no documentation as to why she had a seizure and therefore are not sure that she may not have another. Pt states she has been seizure free for a year and that she can not afford to see a neurologist. I reiterated to the pt that she has to get clearance from a neurologist, pt stated she needs her license so she can pay her bills. Pt then stated well \"son of a mother fucking bitch\" and hung up the phone    "

## 2021-11-26 ENCOUNTER — HOSPITAL ENCOUNTER (OUTPATIENT)
Dept: MRI IMAGING | Facility: HOSPITAL | Age: 21
End: 2021-11-26

## 2021-12-06 ENCOUNTER — TREATMENT (OUTPATIENT)
Dept: PHYSICAL THERAPY | Facility: CLINIC | Age: 21
End: 2021-12-06

## 2021-12-06 DIAGNOSIS — M54.12 RADICULOPATHY, CERVICAL: Primary | ICD-10-CM

## 2021-12-06 PROCEDURE — 97161 PT EVAL LOW COMPLEX 20 MIN: CPT | Performed by: PHYSICAL THERAPIST

## 2021-12-06 NOTE — PROGRESS NOTES
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    RHD    Pain with driving ; pain with lying supine     Scores 36% on NDI    Quality of life: good    Pain  Current pain ratin  Quality: radiating, discomfort and sharp  Relieving factors: change in position  Aggravating factors: overhead activity, movement, lifting, prolonged positioning and sleeping  Progression: no change    Hand dominance: right    Patient Goals  Patient goals for therapy: decreased pain, increased motion, increased strength and independence with ADLs/IADLs             Objective        Special Questions      Additional Special Questions  Denies red flags       Postural Observations  Seated posture: fair  Standing posture: fair        Palpation   Left   Tenderness of the cervical paraspinals, suboccipitals and upper trapezius.     Tenderness   Cervical Spine   No tenderness in the spinous process.     Neurological Testing     Sensation   Cervical/Thoracic   Left   Paresthesia: light touch    Reflexes   Left   Biceps (C5/C6): normal (2+)  Brachioradialis (C6): normal (2+)  Triceps (C7): normal (2+)    Right   Biceps (C5/C6): normal (2+)  Brachioradialis (C6): normal (2+)  Triceps (C7): normal (2+)    Additional Neurological Details   strength: 65# right , 52# left    Active Range of Motion   Cervical/Thoracic Spine   Cervical    Flexion: WFL  Extension: WFL  Left lateral flexion: with pain  Right lateral flexion: with pain  Left rotation: with pain  Right rotation: with pain    Strength/Myotome  Testing     Left Shoulder     Planes of Motion   Flexion: 5   Abduction: 5   External rotation at 0°: 5     Left Elbow   Flexion: 5  Extension: 5    Left Wrist/Hand   Wrist extension: 5  Wrist flexion: 5    Tests   Cervical     Left   Positive active compression (Wilson), cervical distraction and Spurling's sign.           Assessment & Plan     Assessment  Impairments: abnormal muscle tone, abnormal or restricted ROM, activity intolerance, lacks appropriate home exercise program and pain with function  Functional Limitations: carrying objects, lifting, sleeping, uncomfortable because of pain and reaching overhead  Assessment details: 20 yo presents with dx of left sided cervical radiculopathy; she has limited ROM of cervical spine and occasional parasthesia in left hand; she should benefit from PT to restore full functional mobility and to decrease her pain  Prognosis: good    Goals  Plan Goals: 4 weeks:  1. IND with HEP  2. Patient to display full cervical ROM without pain  3. Patient to improve NDI score by 1 MCID  4. Patient to report able to perform full work duties without pain    Plan  Therapy options: will be seen for skilled therapy services  Planned modality interventions: TENS, thermotherapy (hydrocollator packs), traction, ultrasound, high voltage pulsed current (pain management), dry needling and cryotherapy  Planned therapy interventions: manual therapy, postural training, soft tissue mobilization, spinal/joint mobilization, strengthening, stretching, therapeutic activities, joint mobilization, functional ROM exercises, home exercise program and flexibility  Frequency: 2x week  Duration in weeks: 4  Treatment plan discussed with: patient        Manual Therapy:         mins  50668;  Therapeutic Exercise:         mins  30511;     Neuromuscular Adriana:        mins  14158;    Therapeutic Activity:          mins  95801;     Gait Training:           mins  72314;     Ultrasound:          mins  33056;     Electrical Stimulation:         mins  09228 ( );  Dry Needling          mins self-pay    Timed Treatment:      mins   Total Treatment:     35   mins    PT SIGNATURE: SAMUEL Valladares, PT   DATE TREATMENT INITIATED: 12/6/2021    Initial Certification  Certification Period: 3/6/2022  I certify that the therapy services are furnished while this patient is under my care.  The services outlined above are required by this patient, and will be reviewed every 90 days.     PHYSICIAN:       DATE:     Please sign and return via fax to  .. Thank you, [facility].

## 2022-03-21 ENCOUNTER — HOSPITAL ENCOUNTER (OUTPATIENT)
Dept: MRI IMAGING | Facility: HOSPITAL | Age: 22
Discharge: HOME | End: 2022-03-21
Admitting: FAMILY MEDICINE

## 2022-03-21 PROCEDURE — 72141 MRI NECK SPINE W/O DYE: CPT

## 2022-03-25 ENCOUNTER — OFFICE VISIT (OUTPATIENT)
Dept: ORTHOPEDIC SURGERY | Facility: CLINIC | Age: 22
End: 2022-03-25

## 2022-03-25 VITALS — BODY MASS INDEX: 28.6 KG/M2 | WEIGHT: 161.4 LBS | HEIGHT: 63 IN | TEMPERATURE: 98.4 F

## 2022-03-25 DIAGNOSIS — R20.2 ARM PARESTHESIA, LEFT: Primary | ICD-10-CM

## 2022-03-25 DIAGNOSIS — M25.512 ACUTE PAIN OF LEFT SHOULDER: ICD-10-CM

## 2022-03-25 PROCEDURE — 99213 OFFICE O/P EST LOW 20 MIN: CPT | Performed by: PHYSICIAN ASSISTANT

## 2022-03-25 RX ORDER — MIRTAZAPINE 30 MG/1
TABLET, FILM COATED ORAL
COMMUNITY
Start: 2022-03-09

## 2022-03-25 NOTE — PROGRESS NOTES
Subjective   Patient ID: Laurie Graham is a 21 y.o. right hand dominant female  Follow-up of the Left Shoulder (States her shoulder has been hurting for about 5 months, in the last month is has gotten to the point to where she is having trouble doing anything throughout the day due to the pain.)         History of Present Illness  Patient presents with continued complaints of left anterior and posterior shoulder pain with associated numbness and tingling.  She states the symptoms have been ongoing for the last 6 months but worsened over the last 2 months.  She was seen in this office last year with similar complaints and had a cervical spine MRI which was unremarkable.  She notes the symptoms are starting to interfere with her daily activities.  She has tried oral analgesics and steroids without significant improvement.  She is even seen a chiropractor without improvement.  There has been no injury or trauma.                                                  Past Medical History:   Diagnosis Date   • Anxiety    • Asthma    • Bipolar 1 disorder (HCC)    • Depression    • Migraine    • Seizures (HCC)    • Shortness of breath         Past Surgical History:   Procedure Laterality Date   • BREAST SURGERY Left 2016       Family History   Problem Relation Age of Onset   • Depression Mother    • Polycystic ovary syndrome Mother    • Endometriosis Mother    • Hypertension Father    • Cancer Maternal Grandmother         thyroid   • Mental illness Maternal Grandfather    • Diabetes Maternal Grandfather    • Cancer Maternal Great-Grandmother         ovarian       Social History     Socioeconomic History   • Marital status: Single   Tobacco Use   • Smoking status: Never Smoker   • Smokeless tobacco: Never Used   Vaping Use   • Vaping Use: Never used   Substance and Sexual Activity   • Alcohol use: Yes     Alcohol/week: 2.0 standard drinks     Types: 2 Glasses of wine per week   • Drug use: Never   • Sexual activity: Defer  "        Current Outpatient Medications:   •  albuterol sulfate  (90 Base) MCG/ACT inhaler, Inhale 2 puffs Every 4 (Four) Hours As Needed for Wheezing (with spacer best)., Disp: 18 g, Rfl: 0  •  lamoTRIgine (LaMICtal) 25 MG tablet, Take 25 mg by mouth Daily., Disp: , Rfl:   •  mirtazapine (REMERON) 30 MG tablet, , Disp: , Rfl:   •  montelukast (SINGULAIR) 10 MG tablet, Take 1 tablet by mouth Every Night., Disp: 30 tablet, Rfl: 0  •  prazosin (MINIPRESS) 1 MG capsule, Take 1 mg by mouth 2 (Two) Times a Day., Disp: , Rfl:     No Known Allergies    Review of Systems   Constitutional: Negative for fever.   HENT: Negative for dental problem and voice change.    Eyes: Negative for visual disturbance.   Respiratory: Negative for shortness of breath.    Cardiovascular: Negative for chest pain.   Gastrointestinal: Negative for abdominal pain.   Genitourinary: Negative for dysuria.   Musculoskeletal: Positive for arthralgias (left shoulder). Negative for gait problem and joint swelling.   Skin: Negative for rash.   Neurological: Negative for speech difficulty.   Hematological: Does not bruise/bleed easily.   Psychiatric/Behavioral: Negative for confusion.       I have reviewed the medical and surgical history, family history, social history, medications, and/or allergies, and the review of systems of this report.    Objective   Temp 98.4 °F (36.9 °C)   Ht 160 cm (62.99\")   Wt 73.2 kg (161 lb 6.4 oz)   BMI 28.60 kg/m²    Physical Exam  Vitals and nursing note reviewed.   Constitutional:       Appearance: Normal appearance.   Pulmonary:      Effort: Pulmonary effort is normal.   Musculoskeletal:      Left shoulder: Tenderness present. No deformity. Decreased strength.      Cervical back: Tenderness present. Pain with movement present.   Neurological:      Mental Status: She is oriented to person, place, and time.       Left Shoulder Exam     Tenderness   The patient is experiencing tenderness in the biceps " tendon.    Range of Motion   Active abduction: 130   Forward flexion: 130     Muscle Strength   Abduction: 3/5   Biceps: 3/5     Tests   Cross arm: positive  Impingement: positive            Patient is ttp along the left chest pectoralis muscle without subcutaneous crepitus  Neurologic Exam     Mental Status   Oriented to person, place, and time.     Sensory Exam   Sensory deficit distribution on left: non-dermatomal distribution     Negative shoulder shrug.  Negative winged scapula to the left upper extremity    Neg. adson's  Neg. april's    Assessment/Plan   Independent Review of Radiographic Studies:    No new imaging today.  Patient did have x-ray imaging of the left shoulder 11/2021 which revealed no acute process.      Procedures       Diagnoses and all orders for this visit:    1. Arm paresthesia, left (Primary)  -     EMG & Nerve Conduction Test  -     MRI shoulder left wo contrast; Future    2. Acute pain of left shoulder  -     MRI shoulder left wo contrast; Future       Reduced physical activity as appropriate  Weight bearing parameters reviewed  Ice, heat, and/or modalities as beneficial    Recommendations/Plan:  Exercise, medications, injections, other patient advice, and return appointment as noted.  Patient is encouraged to call or return for any issues or concerns.    I would like to order EMG as well as MRI of the left shoulder.  Patient agreeable to call or return sooner for any concerns.             EMR Dragon-transcription disclaimer:  This encounter note is an electronic transcription of spoken language to printed text.  Electronic transcription of spoken language may permit erroneous or at times nonsensical words or phrases to be inadvertently transcribed.  Although I have reviewed the note for such errors, some may still exist

## 2022-04-13 ENCOUNTER — PROCEDURE VISIT (OUTPATIENT)
Dept: ORTHOPEDIC SURGERY | Facility: CLINIC | Age: 22
End: 2022-04-13

## 2022-04-13 DIAGNOSIS — R20.2 PARESTHESIA: ICD-10-CM

## 2022-04-13 DIAGNOSIS — M79.602 LEFT ARM PAIN: ICD-10-CM

## 2022-04-13 DIAGNOSIS — M54.17 LUMBOSACRAL NEURITIS: ICD-10-CM

## 2022-04-13 PROCEDURE — 95909 NRV CNDJ TST 5-6 STUDIES: CPT | Performed by: PHYSICAL THERAPIST

## 2022-04-13 PROCEDURE — 95886 MUSC TEST DONE W/N TEST COMP: CPT | Performed by: PHYSICAL THERAPIST

## 2022-04-27 ENCOUNTER — HOSPITAL ENCOUNTER (OUTPATIENT)
Dept: MRI IMAGING | Facility: HOSPITAL | Age: 22
Discharge: HOME | End: 2022-04-27
Admitting: PHYSICIAN ASSISTANT

## 2022-04-27 DIAGNOSIS — R20.2 ARM PARESTHESIA, LEFT: ICD-10-CM

## 2022-04-27 DIAGNOSIS — M25.512 ACUTE PAIN OF LEFT SHOULDER: ICD-10-CM

## 2022-04-27 PROCEDURE — 73221 MRI JOINT UPR EXTREM W/O DYE: CPT

## 2022-04-27 NOTE — PROGRESS NOTES
Do you care to let the patient know that her MRI left shoulder does NOT reveal RTC tear or internal derangement?  Also, her EMG is WNL, MRI of cervical spine is WNL. I would think a referral to Physical therapy would be a good starting point or referral to Dr. Myers to see if he could offer any ideas

## 2022-08-29 ENCOUNTER — OFFICE VISIT (OUTPATIENT)
Dept: INTERNAL MEDICINE | Facility: CLINIC | Age: 22
End: 2022-08-29

## 2022-08-29 VITALS
RESPIRATION RATE: 16 BRPM | TEMPERATURE: 97.7 F | OXYGEN SATURATION: 98 % | HEART RATE: 88 BPM | HEIGHT: 63 IN | BODY MASS INDEX: 26.4 KG/M2 | WEIGHT: 149 LBS | DIASTOLIC BLOOD PRESSURE: 82 MMHG | SYSTOLIC BLOOD PRESSURE: 125 MMHG

## 2022-08-29 DIAGNOSIS — F31.60 MIXED BIPOLAR DISORDER: ICD-10-CM

## 2022-08-29 DIAGNOSIS — E78.2 MIXED HYPERLIPIDEMIA: Primary | ICD-10-CM

## 2022-08-29 PROCEDURE — 99213 OFFICE O/P EST LOW 20 MIN: CPT | Performed by: INTERNAL MEDICINE

## 2022-09-01 ENCOUNTER — TELEPHONE (OUTPATIENT)
Dept: INTERNAL MEDICINE | Facility: CLINIC | Age: 22
End: 2022-09-01

## 2022-09-01 NOTE — TELEPHONE ENCOUNTER
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  Please call her back and let her know what else she can do.

## 2022-09-06 ENCOUNTER — TELEPHONE (OUTPATIENT)
Dept: INTERNAL MEDICINE | Facility: CLINIC | Age: 22
End: 2022-09-06

## 2022-09-06 DIAGNOSIS — R56.9 SEIZURE: Primary | ICD-10-CM

## 2022-09-06 NOTE — TELEPHONE ENCOUNTER
PATIENT STATES THAT SHE FAXED A PHYSICAL FORM FOR DR SIERRA TO FILL OUT AND SHE WOULD LIKE TO KNOW IF IT WAS RECEIVED.  PLEASE CALL [phone]

## 2022-09-07 NOTE — TELEPHONE ENCOUNTER
PATIENT CALLED STATING SHE FAXED THE FORM YESTERDAY 09/06/22 AROUND 4 PM.     PATIENT NEEDS THIS FORM FILLED OUT STAT AS SHE HAS BEEN WAITING FOR ABOUT 2 WEEKS AS ORIGINAL SHE WAS TOLD DR JONES COULD NOT FILL IT OUR WHEN IN FACT SHE CAN. PLEASE ADVISE [phone]

## 2022-09-07 NOTE — TELEPHONE ENCOUNTER
Left detailed voice mail requesting patient fax the form to [phone] for review and we will let her know if it is something Dr. Steward can or can't do

## 2022-09-07 NOTE — TELEPHONE ENCOUNTER
We received the form - Dr. Steward has reviewed. The form is for [place] [employer] - History of seizures       This is not something she can fill out but she is placing a referral to specialty for evaluation / review.     Patient informed

## 2022-09-15 ENCOUNTER — TELEPHONE (OUTPATIENT)
Dept: INTERNAL MEDICINE | Facility: CLINIC | Age: 22
End: 2022-09-15

## 2022-09-15 NOTE — TELEPHONE ENCOUNTER
Caller: Laurie Graham    Relationship: Self    Best call back number: [phone]    What specialty or service is being requested: NEUROLOGY    What is the provider, practice or medical service name:  [facility]    Any additional details: PATIENT IS WANTING TO BE SEEN SOONER AND  ISNT ABLE TO SEE PATIENT UNTIL December. [facility] IS ABLE TO SEE PATIENT IN November. PLEASE CALL WHEN REFERRAL HAS BEEN SENT TO [facility]

## 2022-10-27 ENCOUNTER — OFFICE VISIT (OUTPATIENT)
Dept: NEUROLOGY | Facility: CLINIC | Age: 22
End: 2022-10-27

## 2022-10-27 VITALS
WEIGHT: 147 LBS | SYSTOLIC BLOOD PRESSURE: 118 MMHG | HEART RATE: 115 BPM | DIASTOLIC BLOOD PRESSURE: 80 MMHG | RESPIRATION RATE: 16 BRPM | HEIGHT: 63 IN | BODY MASS INDEX: 26.05 KG/M2 | TEMPERATURE: 97 F | OXYGEN SATURATION: 98 %

## 2022-10-27 DIAGNOSIS — Z87.898 HISTORY OF SEIZURE: Primary | ICD-10-CM

## 2022-10-27 PROCEDURE — 99203 OFFICE O/P NEW LOW 30 MIN: CPT | Performed by: NURSE PRACTITIONER

## 2022-10-27 NOTE — PROGRESS NOTES
New Patient Office Visit      Patient Name: Laurie Graham  : 2000   MRN: [id number]     Referring Physician: Ara Steward MD    Chief Complaint:    Chief Complaint   Patient presents with   • Seizures     New patient. Last seizure 2019.       History of Present Illness: Laurie Graham is a 22 y.o. female who is here today to establish care with Neurology.  She is a full time college student.  She is here today to have her [place] driving form filled out.  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  She has had no seizures or seizure-like activity in the past year.  She is taking Lamictal 50mg daily for her mood disorder.  She denies any history of head injury.  She denies any new neurological symptoms.  Additional risk factors- BMI 26, mood disorder.   *Says she was most recently seen by a neurologist in [place], [place], Dr. George Chamberlain- those records are not available for review.     Pertinent Medical History:  CT head without contrast on 2020 was unremarkable.      Subjective      Review of Systems:   Review of Systems   Constitutional: Negative for fatigue, fever, unexpected weight gain and unexpected weight loss.   HENT: Negative for hearing loss, sore throat, swollen glands, tinnitus and trouble swallowing.    Eyes: Negative for blurred vision, double vision, photophobia and visual disturbance.   Respiratory: Negative for cough, chest tightness and shortness of breath.    Cardiovascular: Negative for chest pain, palpitations and leg swelling.   Gastrointestinal: Negative for constipation, diarrhea and nausea.   Endocrine: Negative for cold intolerance and heat intolerance.   Musculoskeletal: Negative for gait problem, neck pain and neck stiffness.   Skin: Negative for color change and rash.   Allergic/Immunologic: Negative for environmental allergies and food allergies.   Neurological: Negative for dizziness, syncope, facial asymmetry, speech difficulty, weakness, headache,  "memory problem and confusion.   Psychiatric/Behavioral: Negative for agitation, behavioral problems and depressed mood. The patient is not nervous/anxious.        Past Medical History:   Past Medical History:   Diagnosis Date   • Anxiety    • Asthma    • Bipolar 1 disorder (HCC)    • Depression    • Migraine    • Seizures (HCC)    • Shortness of breath        Past Surgical History:   Past Surgical History:   Procedure Laterality Date   • BREAST SURGERY Left 2016       Family History:   Family History   Problem Relation Age of Onset   • Depression Mother    • Polycystic ovary syndrome Mother    • Endometriosis Mother    • Hypertension Father    • Cancer Maternal Grandmother         thyroid   • Mental illness Maternal Grandfather    • Diabetes Maternal Grandfather    • Cancer Maternal Great-Grandmother         ovarian       Social History:   Social History     Socioeconomic History   • Marital status: Single   Tobacco Use   • Smoking status: Never   • Smokeless tobacco: Never   Vaping Use   • Vaping Use: Never used   Substance and Sexual Activity   • Alcohol use: Yes     Alcohol/week: 2.0 standard drinks     Types: 2 Glasses of wine per week   • Drug use: Never   • Sexual activity: Defer       Medications:     Current Outpatient Medications:   •  lamoTRIgine (LaMICtal) 25 MG tablet, Take 50 mg by mouth Daily., Disp: , Rfl:   •  mirtazapine (REMERON) 30 MG tablet, , Disp: , Rfl:   •  prazosin (MINIPRESS) 1 MG capsule, Take 1 mg by mouth 2 (Two) Times a Day., Disp: , Rfl:     Allergies:   No Known Allergies    Objective     Physical Exam:  Vital Signs:   Vitals:    10/27/22 1103   BP: 118/80   Pulse: 115   Resp: 16   Temp: 97 °F (36.1 °C)   SpO2: 98%   Weight: 66.7 kg (147 lb)   Height: 160 cm (63\")     Body mass index is 26.04 kg/m².     Physical Exam  Vitals and nursing note reviewed.   Constitutional:       General: She is not in acute distress.     Appearance: Normal appearance. She is well-developed. She is not " diaphoretic.   HENT:      Head: Normocephalic and atraumatic.   Eyes:      Extraocular Movements: Extraocular movements intact.      Conjunctiva/sclera: Conjunctivae normal.      Pupils: Pupils are equal, round, and reactive to light.      Comments: OU- 20/13, OS- 20/13, OD- 20/13- with corrective lenses.    Cardiovascular:      Rate and Rhythm: Normal rate and regular rhythm.      Heart sounds: Normal heart sounds. No murmur heard.    No friction rub. No gallop.   Pulmonary:      Effort: Pulmonary effort is normal. No respiratory distress.      Breath sounds: Normal breath sounds. No wheezing or rales.   Musculoskeletal:         General: Normal range of motion.   Skin:     General: Skin is warm and dry.      Findings: No rash.   Neurological:      General: No focal deficit present.      Mental Status: She is alert and oriented to person, place, and time.      Cranial Nerves: Cranial nerves 2-12 are intact.      Coordination: Finger-Nose-Finger Test normal.      Gait: Gait is intact.   Psychiatric:         Mood and Affect: Mood normal.         Speech: Speech normal.         Behavior: Behavior normal.         Thought Content: Thought content normal.         Judgment: Judgment normal.         Neurologic Exam     Mental Status   Oriented to person, place, and time.   Attention: normal. Concentration: normal.   Speech: speech is normal   Level of consciousness: alert  Knowledge: good.     Cranial Nerves   Cranial nerves II through XII intact.     CN II   Visual fields full to confrontation.     CN III, IV, VI   Pupils are equal, round, and reactive to light.  Right pupil: Shape: regular. Reactivity: brisk.   Left pupil: Shape: regular. Reactivity: brisk.   CN III: no CN III palsy  CN VI: no CN VI palsy  Nystagmus: none     CN V   Facial sensation intact.     CN VII   Facial expression full, symmetric.     CN VIII   CN VIII normal.     CN IX, X   CN IX normal.   CN X normal.     CN XI   CN XI normal.     CN XII   CN XII  normal.     Motor Exam   Muscle bulk: normal  Overall muscle tone: normal    Strength   Right biceps: 5/5  Left biceps: 5/5  Right triceps: 5/5  Left triceps: 5/5  Right quadriceps: 5/5  Left quadriceps: 5/5  Right hamstrin/5  Left hamstrin/5    Sensory Exam   Light touch normal.   Proprioception normal.     Gait, Coordination, and Reflexes     Gait  Gait: normal    Coordination   Finger to nose coordination: normal    Tremor   Resting tremor: absent  Intention tremor: absent  Action tremor: absent    Reflexes   Reflexes 2+ except as noted.       Assessment / Plan      Assessment/Plan:   Diagnoses and all orders for this visit:    1. History of seizure (Primary)    2. BMI 26.0-26.9,adult    *I have advised patient of [place] laws regarding seizures and driving- no driving for 90 days following a seizure or seizure-like activity.   *Most recent visit note, MRI brain and EEG requested from Dr. George Chamberlain for review. If unable to obtain records, will order EEG and MRI brain with and without contrast if patient is agreeable.   *I have advised patient friends/family should call 911 for any seizure activity lasting more than 5 minutes. Patient education on seizures and non-epileptic seizures provided today.   *Medical Review Examination for filled out for the [place] [employer]- will be cosigned by Dr. Deshawn Gallardo and then returned to the patient.     Follow Up:   Return in about 1 year (around 10/27/2023) for Follow Up.    CLARENCE Michelle, FNP-C  [facility] [place] [facility] Neurology and Sleep Medicine       Please note that portions of this note may have been completed with a voice recognition program. Efforts were made to edit the dictations, but occasionally words are mistranscribed.

## 2022-11-04 ENCOUNTER — TELEPHONE (OUTPATIENT)
Dept: NEUROLOGY | Facility: CLINIC | Age: 22
End: 2022-11-04

## 2022-11-04 NOTE — TELEPHONE ENCOUNTER
URGENT    PATIENT IS WAITING ON SIGNED PAPERWORK FOR DEPARTMENT OF MOTOR VEHICLES.    PAPERWORK HAS BEEN SCANNING INTO PATIENT MEDIA FILE AND ONE PAGE HAS BEEN SIGNED BY ADITYA CORDERO AND JOSE M PERALTA BUT THE FIRST PAGE HAS NOT BEEN SIGNED YET BY JOSE M PERALTA.    PLEASE ADVISE PATIENT WHEN THIS WILL BE COMPLETED SO SHE CAN .    THANK YOU

## 2022-11-07 NOTE — TELEPHONE ENCOUNTER
Patient is welcome to stop by the office any time between 8am-430PM Monday - Friday to  a copy. However, I believe she can print these from my chart as well.       No answer when calling patient. Okay for hub to relay message.

## 2023-08-03 ENCOUNTER — OFFICE VISIT (OUTPATIENT)
Dept: OBSTETRICS AND GYNECOLOGY | Facility: CLINIC | Age: 23
End: 2023-08-03
Payer: COMMERCIAL

## 2023-08-03 VITALS
DIASTOLIC BLOOD PRESSURE: 78 MMHG | WEIGHT: 147.4 LBS | HEIGHT: 63 IN | SYSTOLIC BLOOD PRESSURE: 110 MMHG | BODY MASS INDEX: 26.12 KG/M2

## 2023-08-03 DIAGNOSIS — N94.3 PMS (PREMENSTRUAL SYNDROME): ICD-10-CM

## 2023-08-03 DIAGNOSIS — N94.6 DYSMENORRHEA: Primary | ICD-10-CM

## 2023-08-03 DIAGNOSIS — N94.10 DYSPAREUNIA IN FEMALE: ICD-10-CM

## 2023-08-03 DIAGNOSIS — Z12.4 SCREENING FOR CERVICAL CANCER: ICD-10-CM

## 2023-08-03 PROCEDURE — 1159F MED LIST DOCD IN RCRD: CPT | Performed by: PHYSICIAN ASSISTANT

## 2023-08-03 PROCEDURE — 1160F RVW MEDS BY RX/DR IN RCRD: CPT | Performed by: PHYSICIAN ASSISTANT

## 2023-08-03 PROCEDURE — 99203 OFFICE O/P NEW LOW 30 MIN: CPT | Performed by: PHYSICIAN ASSISTANT

## 2023-08-03 RX ORDER — VALACYCLOVIR HYDROCHLORIDE 500 MG/1
TABLET, FILM COATED ORAL
COMMUNITY
Start: 2023-06-06

## 2023-08-08 LAB — REF LAB TEST METHOD: NORMAL

## 2023-08-15 ENCOUNTER — TELEPHONE (OUTPATIENT)
Dept: OBSTETRICS AND GYNECOLOGY | Facility: CLINIC | Age: 23
End: 2023-08-15
Payer: COMMERCIAL

## 2023-08-15 NOTE — TELEPHONE ENCOUNTER
----- Message from Karin Whelan PA-C sent at 8/14/2023  5:04 PM EDT -----  I have left 2 voice messages for patient to call me back regarding pelvic ultrasound. Please let patient know ultrasound does not show any concerning findings, no pelvic masses or ovarian cysts. There was discussion of symptoms being concerning for possible endometriosis during her visit.  She could consider doing medical therapy such as hormonal contraception,  Orilissa or Myfembree based on concern for endometriosis. She could consider diagnostic laparoscopy for a definitive diagnosis. She has an appt in a few weeks for colposcopy if she would like to discuss more at that time.

## 2023-11-20 RX ORDER — LAMOTRIGINE 25 MG/1
50 TABLET ORAL DAILY
Qty: 30 TABLET | Refills: 0 | OUTPATIENT
Start: 2023-11-20

## 2023-11-20 NOTE — TELEPHONE ENCOUNTER
Rx Refill Note  Requested Prescriptions     Pending Prescriptions Disp Refills    lamoTRIgine (LaMICtal) 25 MG tablet 30 tablet 0     Sig: Take 2 tablets by mouth Daily.      Last office visit with prescribing clinician: 8/29/2022   Last telemedicine visit with prescribing clinician: Visit date not found   Next office visit with prescribing clinician: Visit date not found   Has appt with GYN 12/19

## 2023-12-19 ENCOUNTER — OFFICE VISIT (OUTPATIENT)
Dept: OBSTETRICS AND GYNECOLOGY | Facility: CLINIC | Age: 23
End: 2023-12-19
Payer: COMMERCIAL

## 2023-12-19 VITALS
DIASTOLIC BLOOD PRESSURE: 62 MMHG | SYSTOLIC BLOOD PRESSURE: 124 MMHG | BODY MASS INDEX: 27.31 KG/M2 | HEIGHT: 62 IN | WEIGHT: 148.4 LBS

## 2023-12-19 DIAGNOSIS — R87.612 LGSIL ON PAP SMEAR OF CERVIX: Primary | ICD-10-CM

## 2023-12-19 DIAGNOSIS — Z32.02 NEGATIVE PREGNANCY TEST: ICD-10-CM

## 2023-12-19 LAB
B-HCG UR QL: NEGATIVE
EXPIRATION DATE: NORMAL
INTERNAL NEGATIVE CONTROL: NORMAL
INTERNAL POSITIVE CONTROL: NORMAL
Lab: NORMAL

## 2023-12-19 NOTE — PROGRESS NOTES
Colposcopy    Date of procedure:  12/19/2023    Risks and benefits discussed? yes  All questions answered? yes  Consents given by the patient  Written consent obtained? yes    Pre-op indication: LGSIL - mild dysplasia  Procedure documentation:    The cervix was bathed in acetic acid.   The findings were as follows:      The transformation zone was able to be seen adequately.    no visible lesions    Ectocervical biopsies were not taken.      An ECC was performed.                            Monsels solution applied to the cervix but no tampon inserted     Colposcopic Impression: Adequate colposcopy  Colposcopic findings are consistent with PAP       Plan: Will base further treatment on pathology results.     Patient instructions:         Pelvic rest/no intercourse for one week. Call office if any heavy bleeding, vaginal odor, pelvic pain, cramping, fever or chills or any other concerns.       This note was electronically signed.    Karin Whelan PA-C  December 19, 2023

## 2023-12-19 NOTE — PROGRESS NOTES
Subjective   Chief Complaint   Patient presents with    Procedure     Colposcopy for abnormal pap smear, LSIL on 2023       Laurie Graham is a 23 y.o. year old  presenting to be seen for abnormal pap.  Had LSIL pap in August  No previous abnormal paps    Past Medical History:   Diagnosis Date    Anxiety     Asthma     Bipolar 1 disorder     Depression     Herpes 2022    Migraine     PMS (premenstrual syndrome)     Seizures     Shortness of breath     Trauma 2018    Urinary tract infection 22        Current Outpatient Medications:     lamoTRIgine (LaMICtal) 25 MG tablet, Take 2 tablets by mouth Daily., Disp: , Rfl:     prazosin (MINIPRESS) 1 MG capsule, Take 1 capsule by mouth 2 (Two) Times a Day., Disp: , Rfl:     valACYclovir (VALTREX) 500 MG tablet, , Disp: , Rfl:    No Known Allergies   Past Surgical History:   Procedure Laterality Date    BREAST BIOPSY  2016    BREAST SURGERY Left 2016    WISDOM TOOTH EXTRACTION  2019      Social History     Socioeconomic History    Marital status: Single   Tobacco Use    Smoking status: Never     Passive exposure: Yes    Smokeless tobacco: Never    Tobacco comments:     Vape   Vaping Use    Vaping Use: Never used   Substance and Sexual Activity    Alcohol use: Not Currently     Alcohol/week: 3.0 standard drinks of alcohol     Types: 3 Drinks containing 0.5 oz of alcohol per week     Comment: 3 a month (socially)    Drug use: Never    Sexual activity: Yes     Partners: Female     Birth control/protection: None      Family History   Problem Relation Age of Onset    Depression Mother     Polycystic ovary syndrome Mother     Endometriosis Mother     Hypertension Father     Cancer Maternal Grandmother         thyroid    Mental illness Maternal Grandfather     Diabetes Maternal Grandfather     Cancer Maternal Great-Grandmother         ovarian       Review of Systems   Constitutional:  Negative for chills, diaphoresis and fever.   Gastrointestinal:  Negative  "for constipation, diarrhea, nausea and vomiting.   Genitourinary:  Negative for difficulty urinating, dysuria, vaginal bleeding and vaginal discharge.           Objective   /62   Ht 157.5 cm (62\")   Wt 67.3 kg (148 lb 6.4 oz)   LMP 11/12/2023 (Approximate)   BMI 27.14 kg/m²     Physical Exam       Result Review :           LIQUID-BASED PAP SMEAR WITH HPV GENOTYPING IF ASCUS (R[facility]SELL,COR,MAD) (08/03/2023 14:11)         Assessment and Plan  Diagnoses and all orders for this visit:    1. 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    Karin Whelan PA-C   December 19, 2023  "

## 2023-12-20 LAB — REF LAB TEST METHOD: NORMAL

## 2023-12-21 RX ORDER — DOXYCYCLINE 100 MG/1
100 TABLET ORAL 2 TIMES DAILY
Qty: 14 TABLET | Refills: 0 | Status: SHIPPED | OUTPATIENT
Start: 2023-12-21 | End: 2023-12-28

## 2023-12-21 NOTE — PROGRESS NOTES
Please inform patient her ECC did not show dysplasia.  It did show chronic endocervicitis/inflammation of the cervix.  I have sent in doxycycline to her pharmacy for this.  Recommend a follow-up Pap smear in 6 months.

## 2024-01-08 ENCOUNTER — OFFICE VISIT (OUTPATIENT)
Dept: NEUROLOGY | Facility: CLINIC | Age: 24
End: 2024-01-08
Payer: COMMERCIAL

## 2024-01-08 VITALS
DIASTOLIC BLOOD PRESSURE: 82 MMHG | OXYGEN SATURATION: 100 % | SYSTOLIC BLOOD PRESSURE: 120 MMHG | HEIGHT: 62 IN | WEIGHT: 149 LBS | HEART RATE: 94 BPM | TEMPERATURE: 97.3 F | BODY MASS INDEX: 27.42 KG/M2

## 2024-01-08 DIAGNOSIS — R41.89 SUBJECTIVE MEMORY COMPLAINTS: ICD-10-CM

## 2024-01-08 DIAGNOSIS — Z87.898 HISTORY OF SEIZURE: Primary | ICD-10-CM

## 2024-01-08 PROCEDURE — 99214 OFFICE O/P EST MOD 30 MIN: CPT | Performed by: NURSE PRACTITIONER

## 2024-01-08 PROCEDURE — 1160F RVW MEDS BY RX/DR IN RCRD: CPT | Performed by: NURSE PRACTITIONER

## 2024-01-08 PROCEDURE — 1159F MED LIST DOCD IN RCRD: CPT | Performed by: NURSE PRACTITIONER

## 2024-01-08 NOTE — PROGRESS NOTES
Follow Up Office Visit      Patient Name: Laurie Graham  : 2000   MRN: [id number]     Chief Complaint:    Chief Complaint   Patient presents with    Follow-up     Patient in office to follow up on seizures.          History of Present Illness: Laurie Graham is a 23 y.o. female who is here today to follow up with seizures and was last seen on 10/27/2022.  She is accompanied by her partner, Alla, today.  Her most recent seizure was on 2020.  Presents with [facility] paperwork to be filled out.  She has a history of psychogenic nonepileptic seizures.  She is taking Lamictal 50mg daily and reports good compliance and toleration.  She has had no seizures and no episodes of loss of time since her previous visit.  She does feel her short term memory is poor and has been for the past year.  According to her partner, she was recently in the middle of [facility] and didn't know where she was for a few seconds.  She says her face turned red and she asked where she was.  This episode was not followed by any postictal period.  Additional risk factors- BMI 27, ADHD, bipolar 1 disorder.   *She scored 30/30 on the MMSE today.   *Seeing Elaine De La Paz- psychiatric provider- in [place], [place].     Following taken from previous visit note:  Laurie Graham is a 22 y.o. female who is here today to establish care with Neurology.  She is a full time college student.  She is here today to have her [place] driving form filled out.  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  She has had no seizures or seizure-like activity in the past year.  She is taking Lamictal 50mg daily for her mood disorder.  She denies any history of head injury.  She denies any new neurological symptoms.  Additional risk factors- BMI 26, mood disorder.   *Says she was most recently seen by a neurologist in [place], [place], Dr. George Chamberlain- those records are not available for review.      Pertinent Medical History:  CT head without contrast on  "9/11/2020 was unremarkable.      Subjective      Review of Systems:   Review of Systems   Neurological:  Positive for seizures and memory problem.       I have reviewed and the following portions of the patient's history were updated as appropriate: past family history, past medical history, past social history, past surgical history and problem list.    Medications:     Current Outpatient Medications:     lamoTRIgine (LaMICtal) 25 MG tablet, Take 4 tablets by mouth Daily., Disp: , Rfl:     prazosin (MINIPRESS) 1 MG capsule, Take 1 capsule by mouth 2 (Two) Times a Day., Disp: , Rfl:     valACYclovir (VALTREX) 500 MG tablet, , Disp: , Rfl:     Allergies:   No Known Allergies    Objective     Physical Exam:  Vital Signs:   Vitals:    01/08/24 1426   BP: 120/82   Pulse: 94   Temp: 97.3 °F (36.3 °C)   SpO2: 100%   Weight: 67.6 kg (149 lb)   Height: 157.5 cm (62\")   PainSc: 0-No pain     Body mass index is 27.25 kg/m².    Physical Exam  Vitals and nursing note reviewed.   Constitutional:       General: She is not in acute distress.     Appearance: She is well-developed. She is not diaphoretic.   HENT:      Head: Normocephalic and atraumatic.   Eyes:      Extraocular Movements: Extraocular movements intact.      Conjunctiva/sclera: Conjunctivae normal.      Pupils: Pupils are equal, round, and reactive to light.   Pulmonary:      Effort: Pulmonary effort is normal. No respiratory distress.   Musculoskeletal:         General: Normal range of motion.   Skin:     General: Skin is warm and dry.   Neurological:      Mental Status: She is alert and oriented to person, place, and time.   Psychiatric:         Mood and Affect: Mood normal.         Speech: Speech normal.         Behavior: Behavior normal.         Thought Content: Thought content normal.         Judgment: Judgment normal.         Neurologic Exam     Mental Status   Oriented to person, place, and time.   Oriented to person.   Oriented to place. Oriented to city. "   Oriented to time. Oriented to year, month, date, day and season.   Registration: recalls 3 of 3 objects. Recall at 5 minutes: recalls 3 of 3 objects. Follows 3 step commands.   Attention: normal. Concentration: normal.   Speech: speech is normal   Level of consciousness: alert  Knowledge: good.     Cranial Nerves     CN III, IV, VI   Pupils are equal, round, and reactive to light.       Assessment / Plan      Assessment/Plan:   Diagnoses and all orders for this visit:    1. History of seizure (Primary)  -     MRI Brain With & Without Contrast; Future    2. Subjective memory complaints  -     Ambulatory Referral to Psychology  -     MRI Brain With & Without Contrast; Future    3. BMI 27.0-27.9,adult    *I have advised patient her short term memory deficits are most likely due to stress. However, will order neuropsychiatric memory testing with Janette Choudhury, psychologist, for further evaluation.   *Patient is aware of [place] laws regarding seizures and driving- no driving for 90 days following a seizure.   *Seizure precautions discussed and encouraged.     Follow Up:   Return in about 1 year (around 1/8/2025) for Follow Up.    CLARENCE Michelle, FNP-C  [facility] Neurology and Sleep Medicine

## 2024-04-10 ENCOUNTER — OFFICE VISIT (OUTPATIENT)
Dept: OBSTETRICS AND GYNECOLOGY | Facility: CLINIC | Age: 24
End: 2024-04-10
Payer: COMMERCIAL

## 2024-04-10 VITALS
DIASTOLIC BLOOD PRESSURE: 74 MMHG | HEIGHT: 62 IN | BODY MASS INDEX: 26.98 KG/M2 | WEIGHT: 146.6 LBS | SYSTOLIC BLOOD PRESSURE: 110 MMHG

## 2024-04-10 DIAGNOSIS — Z30.011 ENCOUNTER FOR INITIAL PRESCRIPTION OF CONTRACEPTIVE PILLS: Primary | ICD-10-CM

## 2024-04-10 DIAGNOSIS — F32.81 PMDD (PREMENSTRUAL DYSPHORIC DISORDER): ICD-10-CM

## 2024-04-10 PROCEDURE — 1160F RVW MEDS BY RX/DR IN RCRD: CPT | Performed by: PHYSICIAN ASSISTANT

## 2024-04-10 PROCEDURE — 1159F MED LIST DOCD IN RCRD: CPT | Performed by: PHYSICIAN ASSISTANT

## 2024-04-10 PROCEDURE — 99213 OFFICE O/P EST LOW 20 MIN: CPT | Performed by: PHYSICIAN ASSISTANT

## 2024-04-10 RX ORDER — LAMOTRIGINE 100 MG/1
TABLET ORAL
COMMUNITY
Start: 2024-04-08

## 2024-04-10 RX ORDER — LEVONORGESTREL AND ETHINYL ESTRADIOL 0.1-0.02MG
1 KIT ORAL DAILY
Qty: 28 TABLET | Refills: 12 | Status: SHIPPED | OUTPATIENT
Start: 2024-04-10 | End: 2025-04-10

## 2024-04-10 NOTE — PROGRESS NOTES
Subjective   Chief Complaint   Patient presents with    Contraception     Birth control consult       Laurie Graham is a 23 y.o. year old  presenting to be seen for contraception.  Wanting hormonal contraception to help more with PMDD/mood swings. Has same sex partner  Used ocp and DMPA in the past and did well on these  ..Patient's last menstrual period was 2024 (exact date).   After reviewing options she elects to start ocp    Past Medical History:   Diagnosis Date    Anxiety     Asthma     Bipolar 1 disorder     Depression     Herpes 2022    Migraine     PMS (premenstrual syndrome)     Seizures     Shortness of breath     Trauma 2018    Urinary tract infection 22        Current Outpatient Medications:     lamoTRIgine (LaMICtal) 100 MG tablet, , Disp: , Rfl:     prazosin (MINIPRESS) 1 MG capsule, Take 1 capsule by mouth 2 (Two) Times a Day., Disp: , Rfl:     valACYclovir (VALTREX) 500 MG tablet, , Disp: , Rfl:     levonorgestrel-ethinyl estradiol (AVIANE,ALESSE,LESSINA) 0.1-20 MG-MCG per tablet, Take 1 tablet by mouth Daily., Disp: 28 tablet, Rfl: 12   No Known Allergies   Past Surgical History:   Procedure Laterality Date    BREAST BIOPSY  2016    BREAST SURGERY Left 2016    WISDOM TOOTH EXTRACTION  2019      Social History     Socioeconomic History    Marital status: Single   Tobacco Use    Smoking status: Never     Passive exposure: Yes    Smokeless tobacco: Never    Tobacco comments:     Vape   Vaping Use    Vaping status: Never Used   Substance and Sexual Activity    Alcohol use: Not Currently     Alcohol/week: 3.0 standard drinks of alcohol     Types: 3 Drinks containing 0.5 oz of alcohol per week     Comment: 3 a month (socially)    Drug use: Never    Sexual activity: Yes     Partners: Female     Birth control/protection: None      Family History   Problem Relation Age of Onset    Depression Mother     Polycystic ovary syndrome Mother     Endometriosis Mother     Hypertension  "Father     Cancer Maternal Grandmother         thyroid    Mental illness Maternal Grandfather     Diabetes Maternal Grandfather     Cancer Maternal Great-Grandmother         ovarian       Review of Systems   Constitutional:  Negative for chills, diaphoresis and fever.   Gastrointestinal: Negative.    Genitourinary:  Negative for difficulty urinating, dysuria, pelvic pain and vaginal discharge.   Psychiatric/Behavioral:  Positive for dysphoric mood.            Objective   /74   Ht 157.5 cm (62\")   Wt 66.5 kg (146 lb 9.6 oz)   LMP 04/06/2024 (Exact Date)   BMI 26.81 kg/m²     Physical Exam  Constitutional:       Appearance: Normal appearance. She is well-developed and well-groomed.   Eyes:      General: Lids are normal.      Extraocular Movements: Extraocular movements intact.      Conjunctiva/sclera: Conjunctivae normal.   Neurological:      General: No focal deficit present.      Mental Status: She is alert and oriented to person, place, and time.   Psychiatric:         Attention and Perception: Attention normal.         Mood and Affect: Mood normal.         Speech: Speech normal.         Behavior: Behavior is cooperative.            Result Review :                   Assessment and Plan  Diagnoses and all orders for this visit:    1. Encounter for initial prescription of contraceptive pills (Primary)    2. PMDD (premenstrual dysphoric disorder)    Other orders  -     levonorgestrel-ethinyl estradiol (AVIANE,ALESSE,LESSINA) 0.1-20 MG-MCG per tablet; Take 1 tablet by mouth Daily.  Dispense: 28 tablet; Refill: 12      Patient Instructions   May start ocp today as day 5 of cycle. Take ocp consistently  Follow up 3 months or prn           This note was electronically signed.    Karin Whelan PA-C   April 10, 2024  "

## 2024-05-21 ENCOUNTER — TELEPHONE (OUTPATIENT)
Dept: OBSTETRICS AND GYNECOLOGY | Facility: CLINIC | Age: 24
End: 2024-05-21
Payer: COMMERCIAL

## 2024-05-21 RX ORDER — VALACYCLOVIR HYDROCHLORIDE 500 MG/1
500 TABLET, FILM COATED ORAL DAILY
Qty: 30 TABLET | Refills: 3 | Status: SHIPPED | OUTPATIENT
Start: 2024-05-21

## 2024-05-21 NOTE — TELEPHONE ENCOUNTER
Patient has an appointment in July and she is calling requesting a refill on Valtrex till her appointment please. Thanks

## 2024-05-25 ENCOUNTER — TELEPHONE (OUTPATIENT)
Dept: URGENT CARE | Facility: CLINIC | Age: 24
End: 2024-05-25
Payer: COMMERCIAL

## 2024-05-25 DIAGNOSIS — J22 ACUTE LOWER RESPIRATORY TRACT INFECTION: Primary | ICD-10-CM

## 2024-05-25 RX ORDER — DOXYCYCLINE HYCLATE 100 MG/1
100 CAPSULE ORAL 2 TIMES DAILY
Qty: 14 CAPSULE | Refills: 0 | Status: SHIPPED | OUTPATIENT
Start: 2024-05-25

## 2024-06-17 NOTE — TELEPHONE ENCOUNTER
Caller: Laurie Graham    Relationship: Self    Best call back number: [phone]     Requested Prescriptions:   Requested Prescriptions     Pending Prescriptions Disp Refills    lamoTRIgine (LaMICtal) 25 MG tablet       Sig: Take 2 tablets by mouth Daily.        Pharmacy where request should be sent:  [facility] [id number] [place] [id number] AT [facility] [phone] [facility] [phone] FX     Last office visit with prescribing clinician: 8/29/2022   Last telemedicine visit with prescribing clinician: Visit date not found   Next office visit with prescribing clinician: Visit date not found     Additional details provided by patient: PATIENT STATED THAT THE DOSAGE SHOULD BE 75 MG.  PATIENT STATED THAT SHE IS NOT ABLE TO GET IN TO SEE HER DOCTOR DECEMBER 6.  PATIENT ONLY HAS 1 PILL LEFT.     Does the patient have less than a 3 day supply:  [x] Yes  [] No    Would you like a call back once the refill request has been completed: [x] Yes [] No    If the office needs to give you a call back, can they leave a voicemail: [x] Yes [] No    Cortes Dupont Rep   11/20/23 14:12 EST    Alert-The patient is alert, awake and responds to voice. The patient is oriented to time, place, and person. The triage nurse is able to obtain subjective information.

## 2024-07-11 ENCOUNTER — OFFICE VISIT (OUTPATIENT)
Dept: OBSTETRICS AND GYNECOLOGY | Facility: CLINIC | Age: 24
End: 2024-07-11
Payer: COMMERCIAL

## 2024-07-11 VITALS
BODY MASS INDEX: 25.69 KG/M2 | HEIGHT: 63 IN | DIASTOLIC BLOOD PRESSURE: 62 MMHG | WEIGHT: 145 LBS | SYSTOLIC BLOOD PRESSURE: 112 MMHG

## 2024-07-11 DIAGNOSIS — Z30.41 ENCOUNTER FOR SURVEILLANCE OF CONTRACEPTIVE PILLS: ICD-10-CM

## 2024-07-11 DIAGNOSIS — N89.8 VAGINAL DRYNESS: ICD-10-CM

## 2024-07-11 DIAGNOSIS — F32.81 PMDD (PREMENSTRUAL DYSPHORIC DISORDER): Primary | ICD-10-CM

## 2024-07-11 PROCEDURE — 1160F RVW MEDS BY RX/DR IN RCRD: CPT | Performed by: PHYSICIAN ASSISTANT

## 2024-07-11 PROCEDURE — 99213 OFFICE O/P EST LOW 20 MIN: CPT | Performed by: PHYSICIAN ASSISTANT

## 2024-07-11 PROCEDURE — 1159F MED LIST DOCD IN RCRD: CPT | Performed by: PHYSICIAN ASSISTANT

## 2024-07-11 NOTE — PATIENT INSTRUCTIONS
Continue Aviane ocp  Encourge trial of Replens twice weekly for vaginal dryness  RTO March 2025 or prn
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
## 2024-09-23 RX ORDER — VALACYCLOVIR HYDROCHLORIDE 500 MG/1
500 TABLET, FILM COATED ORAL DAILY
Qty: 30 TABLET | Refills: 3 | Status: SHIPPED | OUTPATIENT
Start: 2024-09-23

## 2024-11-11 ENCOUNTER — OFFICE VISIT (OUTPATIENT)
Dept: NEUROLOGY | Facility: CLINIC | Age: 24
End: 2024-11-11
Payer: COMMERCIAL

## 2024-11-11 VITALS
HEIGHT: 63 IN | BODY MASS INDEX: 25.16 KG/M2 | HEART RATE: 90 BPM | SYSTOLIC BLOOD PRESSURE: 116 MMHG | WEIGHT: 142 LBS | DIASTOLIC BLOOD PRESSURE: 60 MMHG | TEMPERATURE: 97.1 F | OXYGEN SATURATION: 95 %

## 2024-11-11 DIAGNOSIS — R41.89 SUBJECTIVE MEMORY COMPLAINTS: Primary | ICD-10-CM

## 2024-11-11 PROCEDURE — 99213 OFFICE O/P EST LOW 20 MIN: CPT | Performed by: NURSE PRACTITIONER

## 2024-11-11 PROCEDURE — 1160F RVW MEDS BY RX/DR IN RCRD: CPT | Performed by: NURSE PRACTITIONER

## 2024-11-11 PROCEDURE — 1159F MED LIST DOCD IN RCRD: CPT | Performed by: NURSE PRACTITIONER

## 2024-11-11 NOTE — PROGRESS NOTES
Follow Up Office Visit      Patient Name: Laurie Graham  : 2000   MRN: [id number]     Chief Complaint:    Chief Complaint   Patient presents with    Follow-up     F/U from [facility] Choud[facility]ury- eileen on 24.  Wants to talk about memory testing.     Seizures     No recent SZ.        History of Present Illness: Laurie Graham is a 24 y.o. female who is here today to follow up with memory complaints and seizures.  She has had no seizures since her previous visit.  She has an upcoming appointment with her psychiatrist to discuss medication for ADHD.   *Neuropsychiatric memory testing, from 2024, reviewed again at time of visit.     Following taken from previous visit note:  Laurie Graham is a 23 y.o. female who is here today to follow up with seizures and was last seen on 10/27/2022.  She is accompanied by her partner, Alla, today.  Her most recent seizure was on 2020.  Presents with [facility] paperwork to be filled out.  She has a history of psychogenic nonepileptic seizures.  She is taking Lamictal 50mg daily and reports good compliance and toleration.  She has had no seizures and no episodes of loss of time since her previous visit.  She does feel her short term memory is poor and has been for the past year.  According to her partner, she was recently in the middle of [employer] and didn't know where she was for a few seconds.  She says her face turned red and she asked where she was.  This episode was not followed by any postictal period.  Additional risk factors- BMI 27, ADHD, bipolar 1 disorder.   *She scored 30/30 on the MMSE today.   *Seeing Elaine De La Paz- psychiatric provider- in [place], [place].     Subjective      Review of Systems:   Review of Systems   Neurological:  Positive for memory problem. Negative for seizures.       I have reviewed and the following portions of the patient's history were updated as appropriate: past family history, past medical history, past social history, past surgical history  "and problem list.    Medications:     Current Outpatient Medications:     albuterol sulfate  (90 Base) MCG/ACT inhaler, Inhale 2 puffs Every 4 (Four) Hours As Needed for Wheezing or Shortness of Air., Disp: 18 g, Rfl: 0    lamoTRIgine (LaMICtal) 100 MG tablet, , Disp: , Rfl:     levonorgestrel-ethinyl estradiol (AVIANE,ALESSE,LESSINA) 0.1-20 MG-MCG per tablet, Take 1 tablet by mouth Daily., Disp: 28 tablet, Rfl: 12    valACYclovir (VALTREX) 500 MG tablet, TAKE 1 TABLET BY MOUTH DAILY, Disp: 30 tablet, Rfl: 3    Allergies:   No Known Allergies    Objective     Physical Exam:  Vital Signs:   Vitals:    11/11/24 1328   BP: 116/60   Pulse: 90   Temp: 97.1 °F (36.2 °C)   SpO2: 95%   Weight: 64.4 kg (142 lb)   Height: 160 cm (62.99\")   PainSc: 0-No pain     Body mass index is 25.16 kg/m².    Physical Exam  Vitals and nursing note reviewed.   Constitutional:       General: She is not in acute distress.     Appearance: Normal appearance. She is well-developed and normal weight. She is not diaphoretic.   HENT:      Head: Normocephalic and atraumatic.   Eyes:      Extraocular Movements: Extraocular movements intact.      Conjunctiva/sclera: Conjunctivae normal.   Pulmonary:      Effort: Pulmonary effort is normal. No respiratory distress.   Musculoskeletal:         General: Normal range of motion.   Skin:     General: Skin is dry.   Neurological:      Mental Status: She is alert and oriented to person, place, and time.   Psychiatric:         Mood and Affect: Mood normal.         Behavior: Behavior normal.         Thought Content: Thought content normal.         Judgment: Judgment normal.         Neurological Exam  Mental Status  Alert. Oriented to person, place, and time.    Cranial Nerves  CN III, IV, VI: Extraocular movements intact bilaterally.        Assessment / Plan      Assessment/Plan:   Diagnoses and all orders for this visit:    1. Subjective memory complaints (Primary)    *Encouraged patient to follow up " with her psychiatrist and she is agreeable.   *I have provided her a copy of her Neuropsychiatric Memory Testing today.   *Seizure precautions in place and patient is aware of [place] driving laws- no driving for 90 days following a seizure. If she needs DMV paperwork filled out next year she will send me a message and let me know, so I can get her on for a follow up visit.      Follow Up:   No follow-ups on file.    CLARENCE Michelle, FNP-C  [facility]

## 2024-12-08 ENCOUNTER — TELEMEDICINE (OUTPATIENT)
Dept: FAMILY MEDICINE CLINIC | Facility: TELEHEALTH | Age: 24
End: 2024-12-08
Payer: COMMERCIAL

## 2024-12-08 DIAGNOSIS — J01.40 ACUTE PANSINUSITIS, RECURRENCE NOT SPECIFIED: Primary | ICD-10-CM

## 2024-12-08 DIAGNOSIS — J40 BRONCHITIS: ICD-10-CM

## 2024-12-08 RX ORDER — PREDNISONE 10 MG/1
TABLET ORAL
Qty: 21 TABLET | Refills: 0 | Status: SHIPPED | OUTPATIENT
Start: 2024-12-08

## 2024-12-08 RX ORDER — BROMPHENIRAMINE MALEATE, PSEUDOEPHEDRINE HYDROCHLORIDE, AND DEXTROMETHORPHAN HYDROBROMIDE 2; 30; 10 MG/5ML; MG/5ML; MG/5ML
10 SYRUP ORAL 4 TIMES DAILY PRN
Qty: 200 ML | Refills: 0 | Status: SHIPPED | OUTPATIENT
Start: 2024-12-08

## 2024-12-08 RX ORDER — ALBUTEROL SULFATE 90 UG/1
2 INHALANT RESPIRATORY (INHALATION) EVERY 4 HOURS PRN
Qty: 18 G | Refills: 0 | Status: SHIPPED | OUTPATIENT
Start: 2024-12-08

## 2024-12-08 NOTE — PATIENT INSTRUCTIONS
Sinus Infection, Adult  A sinus infection, also called sinusitis, is inflammation of your sinuses. Sinuses are hollow spaces in the bones around your face. Your sinuses are located:  Around your eyes.  In the middle of your forehead.  Behind your nose.  In your cheekbones.  Mucus normally drains out of your sinuses. When your nasal tissues become inflamed or swollen, mucus can become trapped or blocked. This allows bacteria, viruses, and fungi to grow, which leads to infection. Most infections of the sinuses are caused by a virus.  A sinus infection can develop quickly. It can last for up to 4 weeks (acute) or for more than 12 weeks (chronic). A sinus infection often develops after a cold.  What are the causes?  This condition is caused by anything that creates swelling in the sinuses or stops mucus from draining. This includes:  Allergies.  Asthma.  Infection from bacteria or viruses.  Deformities or blockages in your nose or sinuses.  Abnormal growths in the nose (nasal polyps).  Pollutants, such as chemicals or irritants in the air.  Infection from fungi. This is rare.  What increases the risk?  You are more likely to develop this condition if you:  Have a weak body defense system (immune system).  Do a lot of swimming or diving.  Overuse nasal sprays.  Smoke.  What are the signs or symptoms?  The main symptoms of this condition are pain and a feeling of pressure around the affected sinuses. Other symptoms include:  Stuffy nose or congestion that makes it difficult to breathe through your nose.  Thick yellow or greenish drainage from your nose.  Tenderness, swelling, and warmth over the affected sinuses.  A cough that may get worse at night.  Decreased sense of smell and taste.  Extra mucus that collects in the throat or the back of the nose (postnasal drip) causing a sore throat or bad breath.  Tiredness (fatigue).  Fever.  How is this diagnosed?  This condition is diagnosed based on:  Your symptoms.  Your  medical history.  A physical exam.  Tests to find out if your condition is acute or chronic. This may include:  Checking your nose for nasal polyps.  Viewing your sinuses using a device that has a light (endoscope).  Testing for allergies or bacteria.  Imaging tests, such as an MRI or CT scan.  In rare cases, a bone biopsy may be done to rule out more serious types of fungal sinus disease.  How is this treated?  Treatment for a sinus infection depends on the cause and whether your condition is chronic or acute.  If caused by a virus, your symptoms should go away on their own within 10 days. You may be given medicines to relieve symptoms. They include:  Medicines that shrink swollen nasal passages (decongestants).  A spray that eases inflammation of the nostrils (topical intranasal corticosteroids).  Rinses that help get rid of thick mucus in your nose (nasal saline washes).  Medicines that treat allergies (antihistamines).  Over-the-counter pain relievers.  If caused by bacteria, your health care provider may recommend waiting to see if your symptoms improve. Most bacterial infections will get better without antibiotic medicine. You may be given antibiotics if you have:  A severe infection.  A weak immune system.  If caused by narrow nasal passages or nasal polyps, surgery may be needed.  Follow these instructions at home:  Medicines  Take, use, or apply over-the-counter and prescription medicines only as told by your health care provider. These may include nasal sprays.  If you were prescribed an antibiotic medicine, take it as told by your health care provider. Do not stop taking the antibiotic even if you start to feel better.  Hydrate and humidify    Drink enough fluid to keep your urine pale yellow. Staying hydrated will help to thin your mucus.  Use a cool mist humidifier to keep the humidity level in your home above 50%.  Inhale steam for 10-15 minutes, 3-4 times a day, or as told by your health care  provider. You can do this in the bathroom while a hot shower is running.  Limit your exposure to cool or dry air.  Rest  Rest as much as possible.  Sleep with your head raised (elevated).  Make sure you get enough sleep each night.  General instructions    Apply a warm, moist washcloth to your face 3-4 times a day or as told by your health care provider. This will help with discomfort.  Use nasal saline washes as often as told by your health care provider.  Wash your hands often with soap and water to reduce your exposure to germs. If soap and water are not available, use hand .  Do not smoke. Avoid being around people who are smoking (secondhand smoke).  Keep all follow-up visits. This is important.  Contact a health care provider if:  You have a fever.  Your symptoms get worse.  Your symptoms do not improve within 10 days.  Get help right away if:  You have a severe headache.  You have persistent vomiting.  You have severe pain or swelling around your face or eyes.  You have vision problems.  You develop confusion.  Your neck is stiff.  You have trouble breathing.  These symptoms may be an emergency. Get help right away. Call 911.  Do not wait to see if the symptoms will go away.  Do not drive yourself to the hospital.  Summary  A sinus infection is soreness and inflammation of your sinuses. Sinuses are hollow spaces in the bones around your face.  This condition is caused by nasal tissues that become inflamed or swollen. The swelling traps or blocks the flow of mucus. This allows bacteria, viruses, and fungi to grow, which leads to infection.  If you were prescribed an antibiotic medicine, take it as told by your health care provider. Do not stop taking the antibiotic even if you start to feel better.  Keep all follow-up visits. This is important.  This information is not intended to replace advice given to you by your health care provider. Make sure you discuss any questions you have with your health  care provider.  Document Revised: 11/22/2022 Document Reviewed: 11/22/2022  [employer] Patient Education © 2024 [employer]  Acute Bronchitis, Adult    Acute bronchitis is sudden inflammation of the main airways (bronchi) that come off the windpipe (trachea) in the lungs. The swelling causes the airways to get smaller and make more mucus than normal. This can make it hard to breathe and can cause coughing or noisy breathing (wheezing).  Acute bronchitis may last several weeks. The cough may last longer. Allergies, asthma, and exposure to smoke may make the condition worse.  What are the causes?  This condition can be caused by germs and by substances that irritate the lungs, including:  Cold and flu viruses. The most common cause of this condition is the virus that causes the common cold.  Bacteria. This is less common.  Breathing in substances that irritate the lungs, including:  Smoke from cigarettes and other forms of tobacco.  Dust and pollen.  Fumes from household cleaning products, gases, or burned fuel.  Indoor or outdoor air pollution.  What increases the risk?  The following factors may make you more likely to develop this condition:  A weak body's defense system, also called the immune system.  A condition that affects your lungs and breathing, such as asthma.  What are the signs or symptoms?  Common symptoms of this condition include:  Coughing. This may bring up clear, yellow, or green mucus from your lungs (sputum).  Wheezing.  Runny or stuffy nose.  Having too much mucus in your lungs (chest congestion).  Shortness of breath.  Aches and pains, including sore throat or chest.  How is this diagnosed?  This condition is usually diagnosed based on:  Your symptoms and medical history.  A physical exam.  You may also have other tests, including tests to rule out other conditions, such as pneumonia. These tests include:  A test of lung function.  Test of a mucus sample to look for the presence of  bacteria.  Tests to check the oxygen level in your blood.  Blood tests.  Chest X-ray.  How is this treated?  Most cases of acute bronchitis clear up over time without treatment. Your health care provider may recommend:  Drinking more fluids to help thin your mucus so it is easier to cough up.  Taking inhaled medicine (inhaler) to improve air flow in and out of your lungs.  Using a vaporizer or a humidifier. These are machines that add water to the air to help you breathe better.  Taking a medicine that thins mucus and clears congestion (expectorant).  Taking a medicine that prevents or stops coughing (cough suppressant).  It is not common to take an antibiotic medicine for this condition.  Follow these instructions at home:    Take over-the-counter and prescription medicines only as told by your health care provider.  Use an inhaler, vaporizer, or humidifier as told by your health care provider.  Take two teaspoons (10 mL) of honey at bedtime to lessen coughing at night.  Drink enough fluid to keep your urine pale yellow.  Do not use any products that contain nicotine or tobacco. These products include cigarettes, chewing tobacco, and vaping devices, such as e-cigarettes. If you need help quitting, ask your health care provider.  Get plenty of rest.  Return to your normal activities as told by your health care provider. Ask your health care provider what activities are safe for you.  Keep all follow-up visits. This is important.  How is this prevented?  To lower your risk of getting this condition again:  Wash your hands often with soap and water for at least 20 seconds. If soap and water are not available, use hand .  Avoid contact with people who have cold symptoms.  Try not to touch your mouth, nose, or eyes with your hands.  Avoid breathing in smoke or chemical fumes. Breathing smoke or chemical fumes will make your condition worse.  Get the flu shot every year.  Contact a health care provider if:  Your  symptoms do not improve after 2 weeks.  You have trouble coughing up the mucus.  Your cough keeps you awake at night.  You have a fever.  Get help right away if you:  Cough up blood.  Feel pain in your chest.  Have severe shortness of breath.  Faint or keep feeling like you are going to faint.  Have a severe headache.  Have a fever or chills that get worse.  These symptoms may represent a serious problem that is an emergency. Do not wait to see if the symptoms will go away. Get medical help right away. Call your local emergency services (911 in the [place] Do not drive yourself to the hospital.  Summary  Acute bronchitis is inflammation of the main airways (bronchi) that come off the windpipe (trachea) in the lungs. The swelling causes the airways to get smaller and make more mucus than normal.  Drinking more fluids can help thin your mucus so it is easier to cough up.  Take over-the-counter and prescription medicines only as told by your health care provider.  Do not use any products that contain nicotine or tobacco. These products include cigarettes, chewing tobacco, and vaping devices, such as e-cigarettes. If you need help quitting, ask your health care provider.  Contact a health care provider if your symptoms do not improve after 2 weeks.  This information is not intended to replace advice given to you by your health care provider. Make sure you discuss any questions you have with your health care provider.  Document Revised: 03/30/2023 Document Reviewed: 04/20/2022  [employer] Patient Education © 2024 [employer]

## 2024-12-08 NOTE — PROGRESS NOTES
You have chosen to receive care through a telehealth visit.  Do you consent to use a video/audio connection for your medical care today? Yes     Patient or patient representative verbalized consent for the use of Ambient Listening during the visit with  CLARENCE Bains for chart documentation. 12/8/2024  10:45 EST    CHIEF COMPLAINT  No chief complaint on file.        HPI  Laurie Graham is a 24 y.o. female  presents with complaint of    History of Present Illness  The patient is a 24-year-old female presenting with complaints of a possible sinus infection.    She began experiencing symptoms of a sinus infection approximately a week before Thanksgiving, which included a mild cough. Her condition improved slightly over the holiday weekend, but the cough persisted. Over the past week, her symptoms have worsened, with increased sinus pressure, drainage, and a productive cough. The color of the mucus she is coughing up varies from green to yellow, and occasionally a darker brownish hue.     She reports that her chest feels sore, particularly in the lung area, due to the coughing. She also experiences wheezing and shortness of breath.    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 She has been feeling unwell for the past two weeks and is concerned about her ability to return to work on Monday.       Review of Systems  See HPI    Past Medical History:   Diagnosis Date    ADHD 10/2024    Seen Rachel Leone at [facility] in [place]    Asthma     Autism 10/2024    Seen Rachel gasca [place] at [facility]    Bipolar 1 disorder     Herpes 11/01/2022    Migraine     PMS (premenstrual syndrome) 2011    Seizures     Shortness of breath     Trauma 2018    Urinary tract infection 7/25/23       Family History   Problem Relation Age of Onset    Depression Mother     Polycystic ovary syndrome Mother     Endometriosis Mother     Hypertension Father      Cancer Maternal Grandmother         thyroid    Mental illness Maternal Grandfather     Diabetes Maternal Grandfather     Cancer Maternal Great-Grandmother         ovarian       Social History     Socioeconomic History    Marital status: Single   Tobacco Use    Smoking status: Former     Types: Electronic Cigarette     Start date:      Quit date:      Years since quittin.9     Passive exposure: Yes    Smokeless tobacco: Never    Tobacco comments:     Vape   Vaping Use    Vaping status: Never Used   Substance and Sexual Activity    Alcohol use: Not Currently     Alcohol/week: 3.0 standard drinks of alcohol     Types: 3 Drinks containing 0.5 oz of alcohol per week     Comment: 3 a month (socially)    Drug use: Never    Sexual activity: Yes     Partners: Female     Birth control/protection: Birth control pill       Laurie Graham  reports that she quit smoking about 11 months ago. Her smoking use included electronic cigarette. She started smoking about 23 months ago. She has been exposed to tobacco smoke. She has never used smokeless tobacco.             There were no vitals taken for this visit.    PHYSICAL EXAM  Physical Exam   Constitutional: She is oriented to person, place, and time. She appears well-developed and well-nourished. She does not have a sickly appearance. She does not appear ill.   HENT:   Head: Normocephalic and atraumatic.   Nose: Right sinus exhibits maxillary sinus tenderness and frontal sinus tenderness. Left sinus exhibits maxillary sinus tenderness and frontal sinus tenderness.   Pulmonary/Chest: Effort normal.  No respiratory distress.  Neurological: She is alert and oriented to person, place, and time.           Diagnoses and all orders for this visit:    1. Acute pansinusitis, recurrence not specified (Primary)  -     amoxicillin-clavulanate (AUGMENTIN) 875-125 MG per tablet; Take 1 tablet by mouth 2 (Two) Times a Day for 10 days.  Dispense: 20 tablet; Refill: 0    2.  Bronchitis  -     predniSONE (DELTASONE) 10 MG (21) dose pack; Use as directed on package  Dispense: 21 tablet; Refill: 0  -     albuterol sulfate  (90 Base) MCG/ACT inhaler; Inhale 2 puffs Every 4 (Four) Hours As Needed for Wheezing.  Dispense: 18 g; Refill: 0  -     brompheniramine-pseudoephedrine-DM 30-2-10 MG/5ML syrup; Take 10 mL by mouth 4 (Four) Times a Day As Needed for Congestion, Cough or Allergies.  Dispense: 200 mL; Refill: 0        Assessment & Plan  1. Sinus infection.  She reports worsening sinus pressure, drainage, and a productive cough with green and yellow mucus. Augmentin was prescribed, to be taken twice daily for 10 days. A prednisone pack was also provided, with instructions to follow the directions on the package. Bromfed DM was prescribed for cough and congestion, with a dosage of 2 teaspoons every 6 hours as needed.    2. Bronchitis.  She reports wheezing and shortness of breath. An albuterol inhaler was prescribed, to be used as needed for wheezing, with a dosage of 2 puffs every 4 hours.           FOLLOW-UP  As discussed during visit with PCP/[facility] if no improvement or Urgent Care/Emergency Department if worsening of symptoms    Patient verbalizes understanding of medication dosage, comfort measures, instructions for treatment and follow-up.    Liberty Berg, CLARENCE  12/08/2024  10:45 EST    Mode of Visit: Video  Location of patient: -HOME-  Location of provider: +HOME+  You have chosen to receive care through a telehealth visit.  The patient has signed the video visit consent form.  The visit included audio and video interaction. No technical issues occurred during this visit.      Note Disclaimer: At [facility], we believe that sharing information builds trust and better   relationships. You are receiving this note because you recently visited [facility]. It is possible you   will see health information before a provider has talked with you about it. This kind of  information can   be easy to misunderstand. To help you fully understand what it means for your health, we urge you to   discuss this note with your provider.

## 2024-12-27 ENCOUNTER — TELEMEDICINE (OUTPATIENT)
Dept: FAMILY MEDICINE CLINIC | Facility: TELEHEALTH | Age: 24
End: 2024-12-27
Payer: COMMERCIAL

## 2024-12-27 DIAGNOSIS — J22 LOWER RESPIRATORY INFECTION (E.G., BRONCHITIS, PNEUMONIA, PNEUMONITIS, PULMONITIS): Primary | ICD-10-CM

## 2024-12-27 RX ORDER — PREDNISONE 20 MG/1
20 TABLET ORAL 2 TIMES DAILY
Qty: 6 TABLET | Refills: 0 | Status: SHIPPED | OUTPATIENT
Start: 2024-12-27 | End: 2024-12-30

## 2024-12-27 RX ORDER — AZITHROMYCIN 250 MG/1
TABLET, FILM COATED ORAL
Qty: 6 TABLET | Refills: 0 | Status: SHIPPED | OUTPATIENT
Start: 2024-12-27

## 2024-12-27 NOTE — PROGRESS NOTES
HPI  Laurie Graham is a 24 y.o. female  presents with complaint of ongoing chest congestion (dark yellow), SOA, frequent cough, hard to get mucus up, lungs feel tight, fatigue. Was prescribed augmentin and steroids on  for sinus infection, but she feels like her sinus symptoms have improved and now have settled into chest. No known fever.     Review of Systems    Past Medical History:   Diagnosis Date    ADHD 10/2024    Seen Rachel Leone at [facility] in [place]    Asthma     Autism 10/2024    Seen Rachel Leone in [place] at [facility]    Bipolar 1 disorder     Herpes 2022    Migraine     PMS (premenstrual syndrome) 2011    Seizures     Shortness of breath     Trauma 2018    Urinary tract infection 22       Family History   Problem Relation Age of Onset    Depression Mother     Polycystic ovary syndrome Mother     Endometriosis Mother     Hypertension Father     Cancer Maternal Grandmother         thyroid    Mental illness Maternal Grandfather     Diabetes Maternal Grandfather     Cancer Maternal Great-Grandmother         ovarian       Social History     Socioeconomic History    Marital status: Single   Tobacco Use    Smoking status: Former     Types: Electronic Cigarette     Start date:      Quit date:      Years since quittin.9     Passive exposure: Yes    Smokeless tobacco: Never    Tobacco comments:     Vape   Vaping Use    Vaping status: Never Used   Substance and Sexual Activity    Alcohol use: Not Currently     Alcohol/week: 3.0 standard drinks of alcohol     Types: 3 Drinks containing 0.5 oz of alcohol per week     Comment: 3 a month (socially)    Drug use: Never    Sexual activity: Yes     Partners: Female     Birth control/protection: Birth control pill         There were no vitals taken for this visit.    PHYSICAL EXAM  Physical Exam   Constitutional: She appears well-developed and well-nourished.   HENT:   Head: Normocephalic.   Nose: Nose normal.   Neck:  Neck normal appearance.  Pulmonary/Chest: Effort normal.   Neurological: She is alert.   Psychiatric: She has a normal mood and affect. Her speech is normal.       Diagnoses and all orders for this visit:    1. Lower respiratory infection (e.g., bronchitis, pneumonia, pneumonitis, pulmonitis) (Primary)  -     azithromycin (Zithromax Z-Enoch) 250 MG tablet; Take 2 tablets the first day, then 1 tablet daily for 4 days.  Dispense: 6 tablet; Refill: 0  -     predniSONE (DELTASONE) 20 MG tablet; Take 1 tablet by mouth 2 (Two) Times a Day for 3 days.  Dispense: 6 tablet; Refill: 0          FOLLOW-UP  As discussed during visit with [facility], if symptoms worsen or fail to improve, follow-up with PCP/Urgent Care/Emergency Department.    Patient verbalizes understanding of medications, instructions for treatment and follow-up.    María Kymberlylucretia Boston, APRN  12/27/2024  16:42 EST      Mode of Visit: Video  Location of patient: -HOME-  Location of provider: Home  You have chosen to receive care through a telehealth visit.  The patient has signed the video visit consent form.  The visit included audio and video interaction. No technical issues occurred during this visit.

## 2025-04-01 RX ORDER — VALACYCLOVIR HYDROCHLORIDE 500 MG/1
500 TABLET, FILM COATED ORAL DAILY
Qty: 30 TABLET | Refills: 3 | Status: SHIPPED | OUTPATIENT
Start: 2025-04-01

## 2025-04-07 RX ORDER — TIMOLOL MALEATE 5 MG/ML
1 SOLUTION/ DROPS OPHTHALMIC DAILY
Qty: 84 TABLET | Refills: 1 | Status: SHIPPED | OUTPATIENT
Start: 2025-04-07

## 2025-04-08 ENCOUNTER — OFFICE VISIT (OUTPATIENT)
Dept: INTERNAL MEDICINE | Facility: CLINIC | Age: 25
End: 2025-04-08
Payer: COMMERCIAL

## 2025-04-08 VITALS
OXYGEN SATURATION: 100 % | HEART RATE: 85 BPM | BODY MASS INDEX: 24.8 KG/M2 | WEIGHT: 140 LBS | HEIGHT: 63 IN | TEMPERATURE: 97.7 F | SYSTOLIC BLOOD PRESSURE: 127 MMHG | RESPIRATION RATE: 16 BRPM | DIASTOLIC BLOOD PRESSURE: 80 MMHG

## 2025-04-08 DIAGNOSIS — K58.9 IRRITABLE BOWEL SYNDROME, UNSPECIFIED TYPE: ICD-10-CM

## 2025-04-08 DIAGNOSIS — R10.9 ABDOMINAL PAIN, UNSPECIFIED ABDOMINAL LOCATION: ICD-10-CM

## 2025-04-08 DIAGNOSIS — Z00.00 ROUTINE GENERAL MEDICAL EXAMINATION AT A HEALTH CARE FACILITY: Primary | ICD-10-CM

## 2025-04-08 RX ORDER — ATOMOXETINE 60 MG/1
60 CAPSULE ORAL DAILY
COMMUNITY
Start: 2025-03-28

## 2025-04-08 RX ORDER — PROPRANOLOL HCL 20 MG
20 TABLET ORAL DAILY PRN
COMMUNITY

## 2025-04-08 NOTE — PROGRESS NOTES
Subjective   Laurie Graham is a 24 y.o. female.     Chief Complaint   Patient presents with    Annual Exam       History of Present Illness   Patient is here for an annual visit and due for labs and cholesterol , she complains of chronic abdominal pain with bloating and would like to be checked for celiac disease and lactose intolerance and  has IBS symptoms      Past Medical History:   Diagnosis Date    ADHD 10/2024    Seen Rachel Leone at [facility] in [place]    Allergic 3/26    Possible celiac, disease, or lactose intolerant    Asthma     Autism 10/2024    Seen Rachel Leone in [place] at [facility]    Bipolar 1 disorder     Herpes 11/01/2022    Injury of back Scoliosis    Migraine     PMS (premenstrual syndrome) 2011    Scoliosis 2016    Minor    Seizures     Shortness of breath     Trauma 2018    Urinary tract infection 7/25/23       Past Surgical History:   Procedure Laterality Date    BREAST BIOPSY  2016    BREAST SURGERY Left 2016    WISDOM TOOTH EXTRACTION  2019       Family History   Problem Relation Age of Onset    Depression Mother     Polycystic ovary syndrome Mother     Endometriosis Mother     Hypertension Father     Cancer Maternal Grandmother         thyroid    Mental illness Maternal Grandfather     Diabetes Maternal Grandfather     Cancer Maternal Grandfather     Cancer Maternal Great-Grandmother         ovarian        reports that she quit smoking about 15 months ago. Her smoking use included electronic cigarette and cigarettes. She started smoking about 2 years ago. She has been exposed to tobacco smoke. She has never used smokeless tobacco. She reports that she does not currently use alcohol after a past usage of about 3.0 standard drinks of alcohol per week. She reports that she does not use drugs.    No Known Allergies        Current Outpatient Medications:     albuterol sulfate  (90 Base) MCG/ACT inhaler, Inhale 2 puffs Every 4 (Four) Hours As Needed for  "Wheezing., Disp: 18 g, Rfl: 0    atomoxetine (STRATTERA) 60 MG capsule, Take 1 capsule by mouth Daily., Disp: , Rfl:     lamoTRIgine (LaMICtal) 100 MG tablet, , Disp: , Rfl:     levonorgestrel-ethinyl estradiol (Vienva) 0.1-20 MG-MCG per tablet, TAKE 1 TABLET BY MOUTH DAILY, Disp: 84 tablet, Rfl: 1    propranolol (INDERAL) 20 MG tablet, Take 1 tablet by mouth Daily As Needed (for anxiety)., Disp: , Rfl:     valACYclovir (VALTREX) 500 MG tablet, TAKE 1 TABLET BY MOUTH DAILY, Disp: 30 tablet, Rfl: 3      Objective   Blood pressure 127/80, pulse 85, temperature 97.7 °F (36.5 °C), temperature source Temporal, resp. rate 16, height 160 cm (63\"), weight 63.5 kg (140 lb), SpO2 100%.    Physical Exam  Vitals and nursing note reviewed.   Constitutional:       General: She is not in acute distress.     Appearance: Normal appearance. She is not diaphoretic.   HENT:      Head: Normocephalic and atraumatic.      Right Ear: External ear normal.      Left Ear: External ear normal.      Nose: Nose normal.   Eyes:      Extraocular Movements: Extraocular movements intact.      Conjunctiva/sclera: Conjunctivae normal.   Neck:      Trachea: Trachea normal.   Cardiovascular:      Rate and Rhythm: Normal rate and regular rhythm.      Heart sounds: Normal heart sounds.   Pulmonary:      Effort: Pulmonary effort is normal. No respiratory distress.      Breath sounds: Normal breath sounds.   Abdominal:      General: Abdomen is flat.   Musculoskeletal:      Cervical back: Neck supple.      Comments: Moves all limbs   Skin:     General: Skin is warm.   Neurological:      Mental Status: She is alert and oriented to person, place, and time.      Comments: No gross motor or sensory deficits         BMI is within normal parameters. No other follow-up for BMI required.      Results for orders placed or performed in visit on 12/19/23   POC Pregnancy, Urine    Collection Time: 12/19/23  1:36 PM    Specimen: Urine   Result Value Ref Range    HCG, " Urine, QL Negative Negative    Lot Number [id number]     Internal Positive Control Passed Positive, Passed    Internal Negative Control Passed Negative, Passed    Expiration Date 2025    TISSUE EXAM, [facility] ([facility])    Collection Time: 23  1:39 PM    Specimen: Tissue   Result Value Ref Range    Reference Lab Report       [facility]  [address] [place] [address]  Phone: [phone] or [phone]  Fax: [phone]  Dawit Valerio M.D., Medical Director    PATIENT NAME                                     LABORATORY NO.  LIBORIO KELLY                                    [id number]  [id number]                                 AGE                    SEX   SSN              CLIENT REF #  [facility]                        23        2000      F     [id number]      [id number]    [facility] 3          REQUESTING M.D.           ATTENDING M.D.         COPY [place] [address]                         DATE COLLECTED            DATE RECEIVED          DATE REPORTED  2023    DIAGNOSIS:  ENDOCERVIX, CURETTINGS:  Chronic endocervicitis  Negative for dysplasia    RLL    CLINICAL HISTORY:  Low grade squamous intraepithelial  lesion on Pap smear of cervix    SPECIMENS RECEIVED:  ENDOCERVIX, CURETTINGS    MICROSCOPIC DESCRIPTION:  Tissue blocks are prepared and slides are examined microscopically on all  specimens. See diagnosis for details.    Professional interpretation rendered by Dawit Valerio M.D., F.C.A.P. at  [facility], [facility], [address] [place] [address].    GROSS DESCRIPTION:  Labeled ECC is a small amount of brown soft tissue mixed with mucus  measuring 0.5 x 0.3 x 0.1 cm in aggregate.  Filtered and submitted entirely in 1  block.  BW    REVIEWED, DIAGNOSED AND ELECTRONICALLY  SIGNED BY:    Dawit ONTIVEROS  ALBAN Valerio, .C.A.P.  CPT CODES:  24731           Assessment & Plan   Diagnoses and all orders for this visit:    1. Routine general medical examination at a health care facility (Primary)  -     CBC (No Diff)  -     Comprehensive Metabolic Panel  -     Lipid Panel  -     Hemoglobin A1c  -     TSH    2. Abdominal pain, unspecified abdominal location  -     Ambulatory Referral to Gastroenterology    3. Irritable bowel syndrome, unspecified type  -     Ambulatory Referral to Gastroenterology    plan:  1.physical: Physical exam conducted today,  Will obtain fasting lab work,   Impression: healthy adult Patient. Currently, patient eats a healthy diet. Screening lab work includes glucose, lipid profile , complete blood count and comprehensive panel . The risks and benefits of immunizations were discussed and immunizations are up to date. Advice and education were given regarding nutrition and aerobic exercise. Patient discussion: discussed with the patient.  Physical with preventive exam as well as age and risk appropriate counseling completed.   Patient Discussion: plan discussed with the patient, follow-up visit needed in one year. Medication Review and medication list updated  Advised  regular weight-bearing exercise  2.  Abdominal pain: Obtain labs and refer patient to GI  3.  IBS: Obtain labs and  Refer patient to GI           Ara Steward MD

## 2025-04-09 LAB
ALBUMIN SERPL-MCNC: 4.7 G/DL (ref 3.5–5.2)
ALBUMIN/GLOB SERPL: 1.5 G/DL
ALP SERPL-CCNC: 84 U/L (ref 39–117)
ALT SERPL-CCNC: 10 U/L (ref 1–33)
AST SERPL-CCNC: 18 U/L (ref 1–32)
BILIRUB SERPL-MCNC: 0.4 MG/DL (ref 0–1.2)
BUN SERPL-MCNC: 6 MG/DL (ref 6–20)
BUN/CREAT SERPL: 7 (ref 7–25)
CALCIUM SERPL-MCNC: 10 MG/DL (ref 8.6–10.5)
CHLORIDE SERPL-SCNC: 104 MMOL/L (ref 98–107)
CHOLEST SERPL-MCNC: 236 MG/DL (ref 0–200)
CO2 SERPL-SCNC: 26 MMOL/L (ref 22–29)
CREAT SERPL-MCNC: 0.86 MG/DL (ref 0.57–1)
EGFRCR SERPLBLD CKD-EPI 2021: 96.3 ML/MIN/1.73
ERYTHROCYTE [DISTWIDTH] IN BLOOD BY AUTOMATED COUNT: 12.7 % (ref 12.3–15.4)
GLOBULIN SER CALC-MCNC: 3.2 GM/DL
GLUCOSE SERPL-MCNC: 65 MG/DL (ref 65–99)
HBA1C MFR BLD: 5.1 % (ref 4.8–5.6)
HCT VFR BLD AUTO: 50.9 % (ref 34–46.6)
HDLC SERPL-MCNC: 70 MG/DL (ref 40–60)
HGB BLD-MCNC: 16.3 G/DL (ref 12–15.9)
LDLC SERPL CALC-MCNC: 151 MG/DL (ref 0–100)
MCH RBC QN AUTO: 29.3 PG (ref 26.6–33)
MCHC RBC AUTO-ENTMCNC: 32 G/DL (ref 31.5–35.7)
MCV RBC AUTO: 91.4 FL (ref 79–97)
PLATELET # BLD AUTO: 328 10*3/MM3 (ref 140–450)
POTASSIUM SERPL-SCNC: 3.9 MMOL/L (ref 3.5–5.2)
PROT SERPL-MCNC: 7.9 G/DL (ref 6–8.5)
RBC # BLD AUTO: 5.57 10*6/MM3 (ref 3.77–5.28)
SODIUM SERPL-SCNC: 143 MMOL/L (ref 136–145)
TRIGL SERPL-MCNC: 86 MG/DL (ref 0–150)
TSH SERPL DL<=0.005 MIU/L-ACNC: 1.11 UIU/ML (ref 0.27–4.2)
VLDLC SERPL CALC-MCNC: 15 MG/DL (ref 5–40)
WBC # BLD AUTO: 7.87 10*3/MM3 (ref 3.4–10.8)

## 2025-04-28 ENCOUNTER — OFFICE VISIT (OUTPATIENT)
Dept: NEUROLOGY | Facility: CLINIC | Age: 25
End: 2025-04-28
Payer: COMMERCIAL

## 2025-04-28 VITALS
TEMPERATURE: 98 F | SYSTOLIC BLOOD PRESSURE: 114 MMHG | WEIGHT: 135 LBS | HEIGHT: 63 IN | OXYGEN SATURATION: 100 % | DIASTOLIC BLOOD PRESSURE: 81 MMHG | BODY MASS INDEX: 23.92 KG/M2

## 2025-04-28 DIAGNOSIS — Z87.898 HISTORY OF SEIZURE: Primary | ICD-10-CM

## 2025-04-28 NOTE — PROGRESS NOTES
Follow Up Office Visit      Patient Name: Laurie Graham  : 2000   MRN: [id number]     Chief Complaint:    Chief Complaint   Patient presents with    Follow-up     Patient in office to follow up on seizures. Patient has paper work today for driving.           History of Present Illness: Laurie Graham is a 24 y.o. female who is here today to have Medical Review Board paperwork filled out.  She has had no seizures or episodes of loss of time since her previous visit.  Most recent psychogenic nonepileptic seizure was on 2020.  She takes no medications for seizure disorder.  She is driving without difficulty.      Following taken from previous visit note:  Laurie Graham is a 24 y.o. female who is here today to follow up with memory complaints and seizures.  She has had no seizures since her previous visit.  She has an upcoming appointment with her psychiatrist to discuss medication for ADHD.   *Neuropsychiatric memory testing, from 2024, reviewed again at time of visit.    Subjective      Review of Systems:   Review of Systems   Neurological:  Negative for seizures.       I have reviewed and the following portions of the patient's history were updated as appropriate: past family history, past medical history, past social history, past surgical history and problem list.    Medications:     Current Outpatient Medications:     albuterol sulfate  (90 Base) MCG/ACT inhaler, Inhale 2 puffs Every 4 (Four) Hours As Needed for Wheezing., Disp: 18 g, Rfl: 0    atomoxetine (STRATTERA) 60 MG capsule, Take 1 capsule by mouth Daily., Disp: , Rfl:     lamoTRIgine (LaMICtal) 100 MG tablet, Take 2 tablets by mouth Daily., Disp: , Rfl:     levonorgestrel-ethinyl estradiol (Vienva) 0.1-20 MG-MCG per tablet, TAKE 1 TABLET BY MOUTH DAILY, Disp: 84 tablet, Rfl: 1    propranolol (INDERAL) 20 MG tablet, Take 1 tablet by mouth Daily As Needed (for anxiety)., Disp: , Rfl:     valACYclovir (VALTREX) 500 MG tablet, TAKE 1  "TABLET BY MOUTH DAILY, Disp: 30 tablet, Rfl: 3    Allergies:   No Known Allergies    Objective     Physical Exam:  Vital Signs:   Vitals:    04/28/25 1026   BP: 114/81   BP Location: Left arm   Patient Position: Sitting   Cuff Size: Adult   Pulse: Comment: unable to otabin   Temp: 98 °F (36.7 °C)   SpO2: 100%   Weight: 61.2 kg (135 lb)   Height: 160 cm (63\")   PainSc: 0-No pain     Body mass index is 23.91 kg/m².    Physical Exam  Vitals and nursing note reviewed.   Constitutional:       General: She is not in acute distress.     Appearance: Normal appearance. She is well-developed and normal weight. She is not diaphoretic.   HENT:      Head: Normocephalic and atraumatic.   Eyes:      Extraocular Movements: Extraocular movements intact.      Conjunctiva/sclera: Conjunctivae normal.   Pulmonary:      Effort: Pulmonary effort is normal. No respiratory distress.   Musculoskeletal:         General: Normal range of motion.   Skin:     General: Skin is warm and dry.      Comments: Sunburned skin to right arm and legs   Neurological:      Mental Status: She is alert and oriented to person, place, and time.      Cranial Nerves: Cranial nerves 2-12 are intact.   Psychiatric:         Mood and Affect: Mood normal.         Behavior: Behavior normal.         Thought Content: Thought content normal.         Judgment: Judgment normal.         Neurological Exam  Mental Status  Alert. Oriented to person, place, and time.    Cranial Nerves  CN III, IV, VI: Extraocular movements intact bilaterally.        Assessment / Plan      Assessment/Plan:   Diagnoses and all orders for this visit:    1. History of seizure (Primary)    *Will fill out Medical Review Board DMV paperwork and fax in - will scan into EMR and mail original copy back to patient.    *Patient is aware of [place] laws regarding seizures and driving.     Follow Up:   Return in about 1 year (around 4/28/2026) for Follow Up.    CLARENCE Michelle, FNP-C  [facility] " [facility]

## 2025-05-19 RX ORDER — VALACYCLOVIR HYDROCHLORIDE 500 MG/1
500 TABLET, FILM COATED ORAL DAILY
Qty: 30 TABLET | Refills: 3 | Status: SHIPPED | OUTPATIENT
Start: 2025-05-19

## 2025-06-10 ENCOUNTER — OFFICE VISIT (OUTPATIENT)
Dept: OBSTETRICS AND GYNECOLOGY | Facility: CLINIC | Age: 25
End: 2025-06-10
Payer: COMMERCIAL

## 2025-06-10 VITALS
BODY MASS INDEX: 23.67 KG/M2 | HEIGHT: 63 IN | DIASTOLIC BLOOD PRESSURE: 66 MMHG | SYSTOLIC BLOOD PRESSURE: 110 MMHG | WEIGHT: 133.6 LBS

## 2025-06-10 DIAGNOSIS — Z86.19 HISTORY OF HERPES GENITALIS: Primary | ICD-10-CM

## 2025-06-10 DIAGNOSIS — N89.8 VAGINAL DRYNESS: ICD-10-CM

## 2025-06-10 RX ORDER — MULTIVITAMIN WITH FOLIC ACID 400 MCG
TABLET ORAL
COMMUNITY
Start: 2025-06-06

## 2025-06-10 RX ORDER — VALACYCLOVIR HYDROCHLORIDE 1 G/1
1000 TABLET, FILM COATED ORAL DAILY
Qty: 30 TABLET | Refills: 6 | Status: SHIPPED | OUTPATIENT
Start: 2025-06-10

## 2025-06-10 RX ORDER — ESTRADIOL 0.1 MG/G
CREAM VAGINAL
Qty: 42.5 G | Refills: 1 | Status: SHIPPED | OUTPATIENT
Start: 2025-06-10

## 2025-06-10 NOTE — PROGRESS NOTES
Subjective   Chief Complaint   Patient presents with    Follow-up     Patient states that birth control hasn't improved symptoms.  Having HSV outbreaks while taking Valtrex       Laurie Graham is a 24 y.o. year old  presenting to be seen for complaints of frequent HSV outbreaks despite suppressive therapy with taking Valtrex 500 mg daily.  Also experiencing vaginal dryness. She stopped Vienva ocp about 4 months ago. Ocp had been initiated for PMDD symptoms. Reports the PMDD symptoms are improved but having vaginal dryness still.   Sexually active with same sex partner-does not need contraception  Has had 2 HSV outbreaks in the past 6-8 weeks  Reports she is under significant stress with personal issues going on          Past Medical History:   Diagnosis Date    ADHD 10/2024    Seen Rachel Leone at [facility] in [place]    Allergic 3/26    Possible celiac, disease, or lactose intolerant    Anxiety     Asthma 2000    Autism 10/2024    Seen Rachel Leone in [place] at [facility]    Bipolar 1 disorder     Depression 2018    Herpes 2022    Injury of back Scoliosis    Migraine     PMS (premenstrual syndrome)     Scoliosis 2016    Minor    Seizures 2018    Shortness of breath     Trauma 2018    Urinary tract infection 23        Current Outpatient Medications:     albuterol sulfate  (90 Base) MCG/ACT inhaler, Inhale 2 puffs Every 4 (Four) Hours As Needed for Wheezing., Disp: 18 g, Rfl: 0    atomoxetine (STRATTERA) 60 MG capsule, Take 1 capsule by mouth Daily., Disp: , Rfl:     Daily-Andrew Multivitamin tablet tablet, , Disp: , Rfl:     lamoTRIgine (LaMICtal) 100 MG tablet, Take 2 tablets by mouth Daily., Disp: , Rfl:     valACYclovir (VALTREX) 1000 MG tablet, Take 1 tablet by mouth Daily. If outbreak occurs take one tablet bid for 7 days and then back to one tablet daily, Disp: 30 tablet, Rfl: 6    estradiol (ESTRACE VAGINAL) 0.1 MG/GM vaginal cream, Insert 1 gm  "intravaginally 2 times each week at hs, Disp: 42.5 g, Rfl: 1   No Known Allergies   Past Surgical History:   Procedure Laterality Date    BREAST BIOPSY  2016    BREAST SURGERY Left 2016    WISDOM TOOTH EXTRACTION  2019      Social History     Socioeconomic History    Marital status: Single   Tobacco Use    Smoking status: Passive Smoke Exposure - Never Smoker     Passive exposure: Yes    Smokeless tobacco: Never    Tobacco comments:     Vape   Vaping Use    Vaping status: Never Used   Substance and Sexual Activity    Alcohol use: Not Currently     Alcohol/week: 3.0 standard drinks of alcohol     Types: 3 Drinks containing 0.5 oz of alcohol per week     Comment: 3 a month (socially)    Drug use: Never    Sexual activity: Yes     Partners: Female     Birth control/protection: None, Birth control pill      Family History   Problem Relation Age of Onset    Depression Mother     Polycystic ovary syndrome Mother     Endometriosis Mother     Hypertension Father     Cancer Maternal Grandmother         thyroid    Mental illness Maternal Grandfather     Diabetes Maternal Grandfather     Cancer Maternal Grandfather     Stroke Maternal Grandfather     Cancer Maternal Great-Grandmother         ovarian       Review of Systems   Constitutional: Negative.    Gastrointestinal: Negative.    Genitourinary:  Positive for vaginal pain.           Objective   /66   Ht 160 cm (63\")   Wt 60.6 kg (133 lb 9.6 oz)   LMP 05/20/2025 (Exact Date)   BMI 23.67 kg/m²     Physical Exam  Constitutional:       Appearance: Normal appearance. She is well-developed and well-groomed.   Eyes:      General: Lids are normal.      Extraocular Movements: Extraocular movements intact.      Conjunctiva/sclera: Conjunctivae normal.   Neurological:      General: No focal deficit present.      Mental Status: She is alert and oriented to person, place, and time.   Psychiatric:         Attention and Perception: Attention normal.         Mood and Affect: Mood " normal.         Speech: Speech normal.         Behavior: Behavior is cooperative.            Result Review :                   Assessment and Plan  Diagnoses and all orders for this visit:    1. History of herpes genitalis (Primary)    2. Vaginal dryness    Other orders  -     valACYclovir (VALTREX) 1000 MG tablet; Take 1 tablet by mouth Daily. If outbreak occurs take one tablet bid for 7 days and then back to one tablet daily  Dispense: 30 tablet; Refill: 6  -     estradiol (ESTRACE VAGINAL) 0.1 MG/GM vaginal cream; Insert 1 gm intravaginally 2 times each week at   Dispense: 42.5 g; Refill: 1      Patient Instructions   Follow up 6 weeks for annual exam/pap and review           This note was electronically signed.    Karin Whelan PA-C   Seda 10, 2025

## 2025-06-18 ENCOUNTER — OFFICE VISIT (OUTPATIENT)
Dept: OBSTETRICS AND GYNECOLOGY | Facility: CLINIC | Age: 25
End: 2025-06-18
Payer: COMMERCIAL

## 2025-06-18 VITALS
DIASTOLIC BLOOD PRESSURE: 70 MMHG | SYSTOLIC BLOOD PRESSURE: 116 MMHG | BODY MASS INDEX: 23.57 KG/M2 | HEIGHT: 63 IN | WEIGHT: 133 LBS

## 2025-06-18 DIAGNOSIS — N89.8 VAGINAL ITCHING: Primary | ICD-10-CM

## 2025-06-18 PROCEDURE — 1160F RVW MEDS BY RX/DR IN RCRD: CPT | Performed by: PHYSICIAN ASSISTANT

## 2025-06-18 PROCEDURE — 99459 PELVIC EXAMINATION: CPT | Performed by: PHYSICIAN ASSISTANT

## 2025-06-18 PROCEDURE — 99212 OFFICE O/P EST SF 10 MIN: CPT | Performed by: PHYSICIAN ASSISTANT

## 2025-06-18 PROCEDURE — 1159F MED LIST DOCD IN RCRD: CPT | Performed by: PHYSICIAN ASSISTANT

## 2025-06-18 NOTE — PROGRESS NOTES
Subjective   Chief Complaint   Patient presents with    Follow-up     Patient states she has found a possible vaginal wart       Laurie Graham is a 24 y.o. year old  presenting to be seen for small bump she noted 1 week ago in left groin crease.  Has also had some vaginal itching   No discharge    Past Medical History:   Diagnosis Date    ADHD 10/2024    Seen Rachel Leone at [facility] in [place]    Allergic 3/26    Possible celiac, disease, or lactose intolerant    Anxiety     Asthma 2000    Autism 10/2024    Seen Rachel Leone in [place] at [facility]    Bipolar 1 disorder     Depression 2018    Herpes 2022    Injury of back Scoliosis    Migraine     PMS (premenstrual syndrome)     Scoliosis 2016    Minor    Seizures 2018    Shortness of breath     Trauma 2018    Urinary tract infection 23        Current Outpatient Medications:     albuterol sulfate  (90 Base) MCG/ACT inhaler, Inhale 2 puffs Every 4 (Four) Hours As Needed for Wheezing., Disp: 18 g, Rfl: 0    atomoxetine (STRATTERA) 60 MG capsule, Take 1 capsule by mouth Daily., Disp: , Rfl:     Daily-Andrew Multivitamin tablet tablet, , Disp: , Rfl:     estradiol (ESTRACE VAGINAL) 0.1 MG/GM vaginal cream, Insert 1 gm intravaginally 2 times each week at , Disp: 42.5 g, Rfl: 1    lamoTRIgine (LaMICtal) 100 MG tablet, Take 2 tablets by mouth Daily., Disp: , Rfl:     valACYclovir (VALTREX) 1000 MG tablet, Take 1 tablet by mouth Daily. If outbreak occurs take one tablet bid for 7 days and then back to one tablet daily, Disp: 30 tablet, Rfl: 6   No Known Allergies   Past Surgical History:   Procedure Laterality Date    BREAST BIOPSY  2016    BREAST SURGERY Left 2016    WISDOM TOOTH EXTRACTION  2019      Social History     Socioeconomic History    Marital status: Single   Tobacco Use    Smoking status: Passive Smoke Exposure - Never Smoker     Passive exposure: Yes    Smokeless tobacco: Never    Tobacco comments:      "Vape   Vaping Use    Vaping status: Never Used   Substance and Sexual Activity    Alcohol use: Not Currently     Alcohol/week: 3.0 standard drinks of alcohol     Types: 3 Drinks containing 0.5 oz of alcohol per week     Comment: 3 a month (socially)    Drug use: Never    Sexual activity: Yes     Partners: Female     Birth control/protection: None, Birth control pill      Family History   Problem Relation Age of Onset    Depression Mother     Polycystic ovary syndrome Mother     Endometriosis Mother     Hypertension Father     Cancer Maternal Grandmother         thyroid    Mental illness Maternal Grandfather     Diabetes Maternal Grandfather     Cancer Maternal Grandfather     Stroke Maternal Grandfather     Cancer Maternal Great-Grandmother         ovarian       Review of Systems   Constitutional:  Negative for chills, diaphoresis and fever.   Gastrointestinal: Negative.    Genitourinary:  Negative for difficulty urinating, dysuria and vaginal discharge.           Objective   /70   Ht 160 cm (63\")   Wt 60.3 kg (133 lb)   LMP 05/20/2025 (Exact Date)   BMI 23.56 kg/m²     Physical Exam  Exam conducted with a chaperone present.   Constitutional:       Appearance: Normal appearance. She is well-developed and well-groomed.   Eyes:      General: Lids are normal.      Extraocular Movements: Extraocular movements intact.      Conjunctiva/sclera: Conjunctivae normal.   Genitourinary:     Labia:         Right: No rash, tenderness, lesion or injury.         Left: No rash, tenderness, lesion or injury.       Urethra: No prolapse, urethral pain, urethral swelling or urethral lesion.      Vagina: Vaginal discharge and erythema present. No tenderness, bleeding or lesions.      Cervix: No cervical motion tenderness, discharge, friability or lesion.      Comments: No condyloma or abnormal lesions noted-a few small hypertrophied hair follicles from shaving. Labia majora and groin  Tan creamy discharge  Neurological:      " General: No focal deficit present.      Mental Status: She is alert and oriented to person, place, and time.   Psychiatric:         Attention and Perception: Attention normal.         Mood and Affect: Mood normal.         Speech: Speech normal.         Behavior: Behavior is cooperative.            Result Review :                   Assessment and Plan  Diagnoses and all orders for this visit:    1. Vaginal itching (Primary)  -     NuSwab VG+ - Swab, Vagina      Patient Instructions   No condyloma or abnormal lesions noted on exam  Will contact with swab results when available           This note was electronically signed.    Karin Whelan PA-C   June 18, 2025

## 2025-06-21 LAB
A VAGINAE DNA VAG QL NAA+PROBE: ABNORMAL SCORE
BVAB2 DNA VAG QL NAA+PROBE: ABNORMAL SCORE
C ALBICANS DNA VAG QL NAA+PROBE: POSITIVE
C GLABRATA DNA VAG QL NAA+PROBE: NEGATIVE
C TRACH DNA SPEC QL NAA+PROBE: NEGATIVE
MEGA1 DNA VAG QL NAA+PROBE: ABNORMAL SCORE
N GONORRHOEA DNA VAG QL NAA+PROBE: NEGATIVE
T VAGINALIS DNA VAG QL NAA+PROBE: NEGATIVE

## 2025-06-23 RX ORDER — FLUCONAZOLE 150 MG/1
TABLET ORAL
Qty: 2 TABLET | Refills: 0 | Status: SHIPPED | OUTPATIENT
Start: 2025-06-23